# Patient Record
Sex: FEMALE | HISPANIC OR LATINO | Employment: UNEMPLOYED | URBAN - METROPOLITAN AREA
[De-identification: names, ages, dates, MRNs, and addresses within clinical notes are randomized per-mention and may not be internally consistent; named-entity substitution may affect disease eponyms.]

---

## 2017-01-05 ENCOUNTER — ROUTINE PRENATAL (OUTPATIENT)
Dept: OBGYN | Facility: CLINIC | Age: 37
End: 2017-01-05

## 2017-01-05 ENCOUNTER — APPOINTMENT (OUTPATIENT)
Dept: RADIOLOGY | Facility: IMAGING CENTER | Age: 37
End: 2017-01-05
Attending: OBSTETRICS & GYNECOLOGY

## 2017-01-05 VITALS — SYSTOLIC BLOOD PRESSURE: 120 MMHG | WEIGHT: 252 LBS | BODY MASS INDEX: 47.64 KG/M2 | DIASTOLIC BLOOD PRESSURE: 68 MMHG

## 2017-01-05 DIAGNOSIS — O09.292 HISTORY OF GESTATIONAL DIABETES IN PRIOR PREGNANCY, CURRENTLY PREGNANT IN SECOND TRIMESTER: ICD-10-CM

## 2017-01-05 DIAGNOSIS — O09.92 SUPERVISION OF HIGH RISK PREGNANCY IN SECOND TRIMESTER: ICD-10-CM

## 2017-01-05 DIAGNOSIS — Z86.32 HISTORY OF GESTATIONAL DIABETES IN PRIOR PREGNANCY, CURRENTLY PREGNANT IN SECOND TRIMESTER: ICD-10-CM

## 2017-01-05 DIAGNOSIS — O24.410 DIET CONTROLLED GESTATIONAL DIABETES MELLITUS IN THIRD TRIMESTER: ICD-10-CM

## 2017-01-05 LAB
APPEARANCE UR: NORMAL
BILIRUB UR STRIP-MCNC: NORMAL MG/DL
COLOR UR AUTO: NORMAL
GLUCOSE UR STRIP.AUTO-MCNC: NEGATIVE MG/DL
KETONES UR STRIP.AUTO-MCNC: NEGATIVE MG/DL
LEUKOCYTE ESTERASE UR QL STRIP.AUTO: NORMAL
NITRITE UR QL STRIP.AUTO: NEGATIVE
PH UR STRIP.AUTO: 6 [PH] (ref 5–8)
PROT UR QL STRIP: NORMAL MG/DL
RBC UR QL AUTO: NEGATIVE
SP GR UR STRIP.AUTO: 1.01
UROBILINOGEN UR STRIP-MCNC: NORMAL MG/DL

## 2017-01-05 PROCEDURE — 81002 URINALYSIS NONAUTO W/O SCOPE: CPT | Performed by: OBSTETRICS & GYNECOLOGY

## 2017-01-05 PROCEDURE — 90040 PR PRENATAL FOLLOW UP: CPT | Performed by: OBSTETRICS & GYNECOLOGY

## 2017-01-05 PROCEDURE — 76805 OB US >/= 14 WKS SNGL FETUS: CPT | Performed by: OBSTETRICS & GYNECOLOGY

## 2017-01-05 NOTE — PROGRESS NOTES
Lexy Olivares 36 y.o.  24w2d here today for obstetrical visit. Patient reports good  fetal movement. denies contractions, denies vaginal bleeding, denies loss of fluid.    Pregnancy is complicated by   Patient Active Problem List    Diagnosis Date Noted   • Diet controlled gestational diabetes mellitus (GDM) in second trimester 2016   • Elevated glucose tolerance test 11/10/2016   • Supervision of high risk pregnancy in second trimester 10/17/2016   • History of GDMA2 x 3 - early 1hr GTT ordered 10/17/2016     Fasting blood sugars range <90 (one value of 93)  Postprandial blood sugars range <140 (  2 out of range 153, 142)    Fetal survey scheduled today.      Followup in 1 week   labor precautions are reviewed  Continue kick counts daily after 28 weeks  NST twice weekly after 32 weeks if on insulin

## 2017-01-05 NOTE — PROGRESS NOTES
GDM Class A 1 . OB F/U.  + fetal movement  Denies any VB, LO or UC's  Phone # [phone]  Pharmacy confirmed  Diabetic supplies: no

## 2017-01-05 NOTE — MR AVS SNAPSHOT
Lexy Cyril Olivares   2017 1:45 PM   Routine Prenatal   MRN: [id number]    Department:  [facility]   Dept Phone:  [phone]    Description:  Female : 1980   Provider:  Stephanei Pelletier M.D.           Allergies as of 2017     No Known Allergies      You were diagnosed with     Diet controlled gestational diabetes mellitus in third trimester   [4018509]       Supervision of high risk pregnancy in second trimester   [798464]       History of gestational diabetes in prior pregnancy, currently pregnant in second trimester   [7675914]         Vital Signs     Blood Pressure Weight Last Menstrual Period Smoking Status          120/68 mmHg 114.306 kg (252 lb) 2016 (Exact Date) Never Smoker         Basic Information     Date Of Birth Sex Race Ethnicity Preferred Language    1980 Female  or   Origin (Mongolian,Kazakh,Nauruan,Liberian, etc) Mongolian      Your appointments     2017  2:30 PM   US PREG 60 with PREG CTR US 1   [facility] ([facility])    [facility]  [address] [place] [id number]   [phone]           For [facility] patients only: 1. Please arrive 15 min prior to your appointment time. 2. If you're late, you will be rescheduled for the next available appointment. 3. If you need to reschedule your appointment, please call us at [phone] 48 hours prior to your appointment. 4. Do not bring children as they will not be allowed in exam room. 5. Only one family member may be present in room during exam. 6. The exam will be 30-60 minutes depending on the exam ordered by the physician. 7. The sonographer is not allowed to discuss findings during the exam. Your provider will go over the results with you at your next appointment. 8. The purpose of this ultrasound is to determine if baby is healthy. Diagnostic ultrasounds are NOT to determine the gender of the baby.  9. NO photography or video recording is allowed in exam room. 10. NO cell phones allowed in the exam room. INFORMACION SOBRE DE LA FUENTE ULTRASONIDO 1. Por favor de llegar 15 minutos antes de de la fuente celestino. 2. Si llega tarde, le tenemos que cambiar la celestino para otra fecha. 3. Si necesita cambiar de la fuente celestino, por favor llame 48 horas antes de la celestino. [phone] 4. Por favor no traer niños. No se permiten en cuarto de Ultrasonido. 5. Solamente se permite soniya persona en el cuarto yohana el examen. 6. El examen dura 30-60 minutos, dependiendo del examen ordenado por el Doctor. 7. El Sonógrafo no está autorizado hablar sobre de la fuente examen. De La Fuente doctor o partera le va explicar los resultados en de la fuente próxima celestino. 8. El propósito del Ultrasonido es para determinar si de la fuente kathryn viene saludable. No es para determinar el sexo de de la fuente kathryn. 9. Por favor no fotos o cámaras de grabar. 10. No celulares permitidos en el cuarto de examen.              Problem List              ICD-10-CM Priority Class Noted - Resolved    Supervision of high risk pregnancy in second trimester O09.92   10/17/2016 - Present    History of GDMA2 x 3 - early 1hr GTT ordered O09.292, Z86.32   10/17/2016 - Present    Elevated glucose tolerance test R73.02   11/10/2016 - Present    Diet controlled gestational diabetes mellitus (GDM) in second trimester O24.410   11/22/2016 - Present      Health Maintenance        Date Due Completion Dates    IMM INFLUENZA (1) 9/1/2016 10/21/2014    PAP SMEAR 10/17/2019 10/17/2016, 7/7/2014    IMM DTaP/Tdap/Td Vaccine (2 - Td) 12/18/2024 12/18/2014            Results     POCT Urinalysis      Component Value Standard Range & Units    POC Color  Negative    POC Appearance  Negative    POC Leukocyte Esterase small Negative    POC Nitrites negative Negative    POC Urobiligen  Negative (0.2) mg/dL    POC Protein 1+ Negative mg/dL    POC Urine PH 6.0 5.0 - 8.0    POC Blood negative Negative    POC Specific Gravity 1.015 <1.005 - >1.030    POC Ketones  "negative Negative mg/dL    POC Biliruben  Negative mg/dL    POC Glucose negative Negative mg/dL                        Current Immunizations     Influenza Vaccine Quad Inj (Pf) 10/21/2014    Tdap Vaccine 12/18/2014  9:16 AM      Below and/or attached are the medications your provider expects you to take. Review all of your home medications and newly ordered medications with your provider and/or pharmacist. Follow medication instructions as directed by your provider and/or pharmacist. Please keep your medication list with you and share with your provider. Update the information when medications are discontinued, doses are changed, or new medications (including over-the-counter products) are added; and carry medication information at all times in the event of emergency situations     Allergies:  No Known Allergies          Medications  Valid as of: January 05, 2017 -  2:09 PM    Generic Name Brand Name Tablet Size Instructions for use    Blood Glucose Monitoring Suppl (Misc) Blood Glucose Monitoring Suppl SUPPLIES Test strips order: Test strips for FREESTYLE LITE. Please dispense for patient to check blood sugars 4 times daily.        Blood Glucose Monitoring Suppl (Misc) Blood Glucose Monitoring Suppl SUPPLIES Please dispense test strips for Accu Check Polina. Patient to check sugars 4 times daily        Blood Glucose Monitoring Suppl (Misc) Blood Glucose Monitoring Suppl SUPPLIES Please dispense lancets for Accu Check Polina. Patient to check sugars 4 times daily        Ferrous Sulfate (Tab) ferrous sulfate 325 (65 FE) MG Take 325 mg by mouth every day.        Insulin Syringe-Needle U-100 (Misc) Insulin Syringe-Needle U-100 29G X 1/2\" 0.5 ML Patient to inject insulin at bed time        Lancets (Misc) Lancets  Ultra fine Lancets order: Lancets for patient to check blood sugars 4 times daily.        Norethindrone (Tab) MICRONOR 0.35 MG Take 1 Tab by mouth every day.        Prenatal Vitamins (Tab) Prenatal Vitamins (DIS) " Take 1 Each by mouth every day.        .                 Medicines prescribed today were sent to:     [facility] [facility], [place] - [address]    [address] [place] [address]    Phone: [phone] Fax: [phone]    Open 24 Hours?: No      Medication refill instructions:       If your prescription bottle indicates you have medication refills left, it is not necessary to call your provider’s office. Please contact your pharmacy and they will refill your medication.    If your prescription bottle indicates you do not have any refills left, you may request refills at any time through one of the following ways: The online [employer] system (except Urgent Care), by calling your provider’s office, or by asking your pharmacy to contact your provider’s office with a refill request. Medication refills are processed only during regular business hours and may not be available until the next business day. Your provider may request additional information or to have a follow-up visit with you prior to refilling your medication.   *Please Note: Medication refills are assigned a new Rx number when refilled electronically. Your pharmacy may indicate that no refills were authorized even though a new prescription for the same medication is available at the pharmacy. Please request the medicine by name with the pharmacy before contacting your provider for a refill.           [employer] Status: Patient Declined

## 2017-01-06 ENCOUNTER — DATING (OUTPATIENT)
Dept: OBGYN | Facility: CLINIC | Age: 37
End: 2017-01-06

## 2017-01-12 ENCOUNTER — ROUTINE PRENATAL (OUTPATIENT)
Dept: OBGYN | Facility: CLINIC | Age: 37
End: 2017-01-12

## 2017-01-12 VITALS — DIASTOLIC BLOOD PRESSURE: 62 MMHG | WEIGHT: 252.7 LBS | SYSTOLIC BLOOD PRESSURE: 112 MMHG | BODY MASS INDEX: 47.77 KG/M2

## 2017-01-12 DIAGNOSIS — O24.410 DIET CONTROLLED GESTATIONAL DIABETES MELLITUS IN THIRD TRIMESTER: ICD-10-CM

## 2017-01-12 DIAGNOSIS — O09.292 HISTORY OF GESTATIONAL DIABETES IN PRIOR PREGNANCY, CURRENTLY PREGNANT IN SECOND TRIMESTER: ICD-10-CM

## 2017-01-12 DIAGNOSIS — Z86.32 HISTORY OF GESTATIONAL DIABETES IN PRIOR PREGNANCY, CURRENTLY PREGNANT IN SECOND TRIMESTER: ICD-10-CM

## 2017-01-12 DIAGNOSIS — O09.92 SUPERVISION OF HIGH RISK PREGNANCY IN SECOND TRIMESTER: ICD-10-CM

## 2017-01-12 LAB
APPEARANCE UR: NORMAL
BILIRUB UR STRIP-MCNC: NORMAL MG/DL
COLOR UR AUTO: NORMAL
GLUCOSE UR STRIP.AUTO-MCNC: NEGATIVE MG/DL
KETONES UR STRIP.AUTO-MCNC: NEGATIVE MG/DL
LEUKOCYTE ESTERASE UR QL STRIP.AUTO: NEGATIVE
NITRITE UR QL STRIP.AUTO: NEGATIVE
PH UR STRIP.AUTO: 6 [PH] (ref 5–8)
PROT UR QL STRIP: NORMAL MG/DL
RBC UR QL AUTO: NEGATIVE
SP GR UR STRIP.AUTO: 1.02
UROBILINOGEN UR STRIP-MCNC: NORMAL MG/DL

## 2017-01-12 PROCEDURE — 81002 URINALYSIS NONAUTO W/O SCOPE: CPT | Performed by: OBSTETRICS & GYNECOLOGY

## 2017-01-12 PROCEDURE — 90040 PR PRENATAL FOLLOW UP: CPT | Performed by: OBSTETRICS & GYNECOLOGY

## 2017-01-12 NOTE — PROGRESS NOTES
Pt denies CTX, LOF, VB or any other c/o.  Good fetal movement.    Lab:  Recent Results (from the past 672 hour(s))   POCT Urinalysis    Collection Time: 16 11:00 AM   Result Value Ref Range    POC Color  Negative    POC Appearance  Negative    POC Leukocyte Esterase Negative Negative    POC Nitrites Negative Negative    POC Urobiligen  Negative (0.2) mg/dL    POC Protein Negative Negative mg/dL    POC Urine PH 8.0 5.0 - 8.0    POC Blood Negative Negative    POC Specific Gravity 1.015 <1.005 - >1.030    POC Ketones Negative Negative mg/dL    POC Biliruben  Negative mg/dL    POC Glucose Negative Negative mg/dL   POCT Urinalysis    Collection Time: 17  1:50 PM   Result Value Ref Range    POC Color  Negative    POC Appearance  Negative    POC Leukocyte Esterase small Negative    POC Nitrites negative Negative    POC Urobiligen  Negative (0.2) mg/dL    POC Protein 1+ Negative mg/dL    POC Urine PH 6.0 5.0 - 8.0    POC Blood negative Negative    POC Specific Gravity 1.015 <1.005 - >1.030    POC Ketones negative Negative mg/dL    POC Biliruben  Negative mg/dL    POC Glucose negative Negative mg/dL       A/P:  36 y.o.  at 25w2d presents for obstetric follow-up. A1GDM - good control    1.  Continue prenatal vitamins.  2.  Begin/continue kick counts at 28 weeks   3.  Watch weight gain.  4.  Increase water intake.  5.  Follow-up in 2 weeks.  6.  PTL/labor precautions given

## 2017-01-12 NOTE — MR AVS SNAPSHOT
Lexy Olivares   2017 2:30 PM   Routine Prenatal   MRN: [id number]    Department:  [facility]   Dept Phone:  [phone]    Description:  Female : 1980   Provider:  Alisson Márquez M.D.           Allergies as of 2017     No Known Allergies      You were diagnosed with     Supervision of high risk pregnancy in second trimester   [222469]       History of gestational diabetes in prior pregnancy, currently pregnant in second trimester   [3203287]       Diet controlled gestational diabetes mellitus in third trimester   [7578461]         Vital Signs     Blood Pressure Weight Last Menstrual Period Smoking Status          112/62 mmHg 114.624 kg (252 lb 11.2 oz) 2016 (Exact Date) Never Smoker         Basic Information     Date Of Birth Sex Race Ethnicity Preferred Language    1980 Female  or   Origin (Colombian,Japanese,Jordanian,Reynaldo, etc) Colombian      Problem List              ICD-10-CM Priority Class Noted - Resolved    Supervision of high risk pregnancy in second trimester O09.92   10/17/2016 - Present    History of GDMA2 x 3 - early 1hr GTT ordered O09.292, Z86.32   10/17/2016 - Present    Elevated glucose tolerance test R73.02   11/10/2016 - Present    Diet controlled gestational diabetes mellitus (GDM) in second trimester O24.410   2016 - Present      Health Maintenance        Date Due Completion Dates    IMM INFLUENZA (1) 2016 10/21/2014    PAP SMEAR 10/17/2019 10/17/2016, 2014    IMM DTaP/Tdap/Td Vaccine (2 - Td) 2024            Current Immunizations     Influenza Vaccine Quad Inj (Pf) 10/21/2014    Tdap Vaccine 2014  9:16 AM      Below and/or attached are the medications your provider expects you to take. Review all of your home medications and newly ordered medications with your provider and/or pharmacist. Follow medication instructions as directed by your provider and/or pharmacist. Please keep  "your medication list with you and share with your provider. Update the information when medications are discontinued, doses are changed, or new medications (including over-the-counter products) are added; and carry medication information at all times in the event of emergency situations     Allergies:  No Known Allergies          Medications  Valid as of: January 12, 2017 -  3:11 PM    Generic Name Brand Name Tablet Size Instructions for use    Blood Glucose Monitoring Suppl (Misc) Blood Glucose Monitoring Suppl SUPPLIES Test strips order: Test strips for FREESTYLE LITE. Please dispense for patient to check blood sugars 4 times daily.        Blood Glucose Monitoring Suppl (Misc) Blood Glucose Monitoring Suppl SUPPLIES Please dispense test strips for Accu Check Polina. Patient to check sugars 4 times daily        Blood Glucose Monitoring Suppl (Misc) Blood Glucose Monitoring Suppl SUPPLIES Please dispense lancets for Accu Check Polina. Patient to check sugars 4 times daily        Ferrous Sulfate (Tab) ferrous sulfate 325 (65 FE) MG Take 325 mg by mouth every day.        Insulin Syringe-Needle U-100 (Misc) Insulin Syringe-Needle U-100 29G X 1/2\" 0.5 ML Patient to inject insulin at bed time        Lancets (Misc) Lancets  Ultra fine Lancets order: Lancets for patient to check blood sugars 4 times daily.        Norethindrone (Tab) MICRONOR 0.35 MG Take 1 Tab by mouth every day.        Prenatal Vitamins (Tab) Prenatal Vitamins (DIS) Take 1 Each by mouth every day.        .                 Medicines prescribed today were sent to:     [facility] [address] [place] [address] - [address]     [address] [facility] [address]    Phone: [phone] Fax: [phone]    Open 24 Hours?: No      Medication refill instructions:       If your prescription bottle indicates you have medication refills left, it is not necessary to call your provider’s office. Please contact your pharmacy and they will refill your medication.    If your " prescription bottle indicates you do not have any refills left, you may request refills at any time through one of the following ways: The online [employer] system (except Urgent Care), by calling your provider’s office, or by asking your pharmacy to contact your provider’s office with a refill request. Medication refills are processed only during regular business hours and may not be available until the next business day. Your provider may request additional information or to have a follow-up visit with you prior to refilling your medication.   *Please Note: Medication refills are assigned a new Rx number when refilled electronically. Your pharmacy may indicate that no refills were authorized even though a new prescription for the same medication is available at the pharmacy. Please request the medicine by name with the pharmacy before contacting your provider for a refill.        Instructions    PTL precautions          [employer] Status: Patient Declined

## 2017-01-26 ENCOUNTER — ROUTINE PRENATAL (OUTPATIENT)
Dept: OBGYN | Facility: CLINIC | Age: 37
End: 2017-01-26

## 2017-01-26 VITALS — SYSTOLIC BLOOD PRESSURE: 110 MMHG | BODY MASS INDEX: 48.21 KG/M2 | WEIGHT: 255 LBS | DIASTOLIC BLOOD PRESSURE: 64 MMHG

## 2017-01-26 DIAGNOSIS — Z86.32 HISTORY OF GESTATIONAL DIABETES IN PRIOR PREGNANCY, CURRENTLY PREGNANT IN SECOND TRIMESTER: ICD-10-CM

## 2017-01-26 DIAGNOSIS — O09.92 SUPERVISION OF HIGH RISK PREGNANCY IN SECOND TRIMESTER: ICD-10-CM

## 2017-01-26 DIAGNOSIS — O09.292 HISTORY OF GESTATIONAL DIABETES IN PRIOR PREGNANCY, CURRENTLY PREGNANT IN SECOND TRIMESTER: ICD-10-CM

## 2017-01-26 LAB
APPEARANCE UR: NORMAL
BILIRUB UR STRIP-MCNC: NORMAL MG/DL
COLOR UR AUTO: NORMAL
GLUCOSE UR STRIP.AUTO-MCNC: NEGATIVE MG/DL
KETONES UR STRIP.AUTO-MCNC: NEGATIVE MG/DL
LEUKOCYTE ESTERASE UR QL STRIP.AUTO: NEGATIVE
NITRITE UR QL STRIP.AUTO: NEGATIVE
PH UR STRIP.AUTO: 6.5 [PH] (ref 5–8)
PROT UR QL STRIP: NEGATIVE MG/DL
RBC UR QL AUTO: NEGATIVE
SP GR UR STRIP.AUTO: 1.01
UROBILINOGEN UR STRIP-MCNC: NORMAL MG/DL

## 2017-01-26 PROCEDURE — 81002 URINALYSIS NONAUTO W/O SCOPE: CPT | Performed by: OBSTETRICS & GYNECOLOGY

## 2017-01-26 PROCEDURE — 90040 PR PRENATAL FOLLOW UP: CPT | Performed by: OBSTETRICS & GYNECOLOGY

## 2017-01-26 NOTE — MR AVS SNAPSHOT
Lexy Olivares   2017 3:00 PM   Routine Prenatal   MRN: [id number]    Department:  [facility]   Dept Phone:  [phone]    Description:  Female : 1980   Provider:  Sosa Burger M.D.           Allergies as of 2017     No Known Allergies      You were diagnosed with     Supervision of high risk pregnancy in second trimester   [491693]       History of gestational diabetes in prior pregnancy, currently pregnant in second trimester   [8368526]         Vital Signs     Blood Pressure Weight Last Menstrual Period Smoking Status          110/64 mmHg 115.667 kg (255 lb) 2016 (Exact Date) Never Smoker         Basic Information     Date Of Birth Sex Race Ethnicity Preferred Language    1980 Female  or   Origin (Greek,Portuguese,South African,Cambodian, etc) Greek      Your appointments     2017  3:00 PM   Diabetic with VINH EMERSON   [facility] [address] [address] [phone]              Problem List              ICD-10-CM Priority Class Noted - Resolved    Supervision of high risk pregnancy in second trimester O09.92   10/17/2016 - Present    History of GDMA2 x 3 - early 1hr GTT ordered O09.292, Z86.32   10/17/2016 - Present    Elevated glucose tolerance test R73.02   11/10/2016 - Present    Diet controlled gestational diabetes mellitus (GDM) in second trimester O24.410   2016 - Present      Health Maintenance        Date Due Completion Dates    IMM INFLUENZA (1) 2016 10/21/2014    PAP SMEAR 10/17/2019 10/17/2016, 2014    IMM DTaP/Tdap/Td Vaccine (2 - Td) 2024            Results     POCT Urinalysis                   Current Immunizations     Influenza Vaccine Quad Inj (Pf) 10/21/2014    Tdap Vaccine 2014  9:16 AM      Below and/or attached are the medications your provider expects you to take. Review all of your home medications and newly ordered medications  "with your provider and/or pharmacist. Follow medication instructions as directed by your provider and/or pharmacist. Please keep your medication list with you and share with your provider. Update the information when medications are discontinued, doses are changed, or new medications (including over-the-counter products) are added; and carry medication information at all times in the event of emergency situations     Allergies:  No Known Allergies          Medications  Valid as of: January 26, 2017 -  3:13 PM    Generic Name Brand Name Tablet Size Instructions for use    Blood Glucose Monitoring Suppl (Misc) Blood Glucose Monitoring Suppl SUPPLIES Test strips order: Test strips for FREESTYLE LITE. Please dispense for patient to check blood sugars 4 times daily.        Blood Glucose Monitoring Suppl (Misc) Blood Glucose Monitoring Suppl SUPPLIES Please dispense test strips for Accu Check Polina. Patient to check sugars 4 times daily        Blood Glucose Monitoring Suppl (Misc) Blood Glucose Monitoring Suppl SUPPLIES Please dispense lancets for Accu Check Polina. Patient to check sugars 4 times daily        Ferrous Sulfate (Tab) ferrous sulfate 325 (65 FE) MG Take 325 mg by mouth every day.        Insulin Syringe-Needle U-100 (Misc) Insulin Syringe-Needle U-100 29G X 1/2\" 0.5 ML Patient to inject insulin at bed time        Lancets (Misc) Lancets  Ultra fine Lancets order: Lancets for patient to check blood sugars 4 times daily.        Norethindrone (Tab) MICRONOR 0.35 MG Take 1 Tab by mouth every day.        Prenatal Vitamins (Tab) Prenatal Vitamins (DIS) Take 1 Each by mouth every day.        .                 Medicines prescribed today were sent to:     [facility] [address] [place], [place] [address] [address] [facility] [facility] [address]    Phone: [phone] Fax: [phone]    Open 24 Hours?: No      Medication refill instructions:       If your prescription bottle indicates you have medication refills left, it is not " necessary to call your provider’s office. Please contact your pharmacy and they will refill your medication.    If your prescription bottle indicates you do not have any refills left, you may request refills at any time through one of the following ways: The online [employer] system (except Urgent Care), by calling your provider’s office, or by asking your pharmacy to contact your provider’s office with a refill request. Medication refills are processed only during regular business hours and may not be available until the next business day. Your provider may request additional information or to have a follow-up visit with you prior to refilling your medication.   *Please Note: Medication refills are assigned a new Rx number when refilled electronically. Your pharmacy may indicate that no refills were authorized even though a new prescription for the same medication is available at the pharmacy. Please request the medicine by name with the pharmacy before contacting your provider for a refill.           "[employer]hart Status: Patient Declined

## 2017-01-26 NOTE — PROGRESS NOTES
Lexy Nam Elise is a 36 y.o.  at 27w2d here today for obstetrical visit.  Patient is without complaints.    She reports good fetal movement.  She denies vaginal bleeding.  She denies rupture of membranes.  She denies contractions.     has Supervision of high risk pregnancy in second trimester; History of GDMA2 x 3 - early 1hr GTT ordered; Elevated glucose tolerance test; and Diet controlled gestational diabetes mellitus (GDM) in second trimester on her problem list.    Patient with some mildly elevated fasting blood sugars, has had gestational diabetes in the past and also been on insulin in the past. We'll likely need insulin in the next few visits    A/P IUP at 27w2d  AFP done  1 hour glucola done  Rhogam done  GBS     F/U in 1 weeks

## 2017-01-26 NOTE — PROGRESS NOTES
Pt here for OB f/u. Denies any complications today. Reports +FM.  Good phone# [phone]  Pharmacy verified with pt.

## 2017-02-02 ENCOUNTER — ROUTINE PRENATAL (OUTPATIENT)
Dept: OBGYN | Facility: CLINIC | Age: 37
End: 2017-02-02

## 2017-02-02 VITALS — DIASTOLIC BLOOD PRESSURE: 68 MMHG | WEIGHT: 254 LBS | BODY MASS INDEX: 48.02 KG/M2 | SYSTOLIC BLOOD PRESSURE: 112 MMHG

## 2017-02-02 DIAGNOSIS — Z86.32 HISTORY OF GESTATIONAL DIABETES IN PRIOR PREGNANCY, CURRENTLY PREGNANT IN SECOND TRIMESTER: ICD-10-CM

## 2017-02-02 DIAGNOSIS — O09.92 SUPERVISION OF HIGH RISK PREGNANCY IN SECOND TRIMESTER: ICD-10-CM

## 2017-02-02 DIAGNOSIS — O09.292 HISTORY OF GESTATIONAL DIABETES IN PRIOR PREGNANCY, CURRENTLY PREGNANT IN SECOND TRIMESTER: ICD-10-CM

## 2017-02-02 DIAGNOSIS — O24.410 DIET CONTROLLED GESTATIONAL DIABETES MELLITUS IN THIRD TRIMESTER: ICD-10-CM

## 2017-02-02 LAB
APPEARANCE UR: NORMAL
BILIRUB UR STRIP-MCNC: NORMAL MG/DL
COLOR UR AUTO: NORMAL
GLUCOSE UR STRIP.AUTO-MCNC: NEGATIVE MG/DL
KETONES UR STRIP.AUTO-MCNC: NEGATIVE MG/DL
LEUKOCYTE ESTERASE UR QL STRIP.AUTO: NORMAL
NITRITE UR QL STRIP.AUTO: NEGATIVE
PH UR STRIP.AUTO: 6 [PH] (ref 5–8)
PROT UR QL STRIP: NORMAL MG/DL
RBC UR QL AUTO: NEGATIVE
SP GR UR STRIP.AUTO: 1.02
UROBILINOGEN UR STRIP-MCNC: NORMAL MG/DL

## 2017-02-02 PROCEDURE — 81002 URINALYSIS NONAUTO W/O SCOPE: CPT | Performed by: OBSTETRICS & GYNECOLOGY

## 2017-02-02 PROCEDURE — 90715 TDAP VACCINE 7 YRS/> IM: CPT | Performed by: OBSTETRICS & GYNECOLOGY

## 2017-02-02 PROCEDURE — 90040 PR PRENATAL FOLLOW UP: CPT | Performed by: OBSTETRICS & GYNECOLOGY

## 2017-02-02 PROCEDURE — 90471 IMMUNIZATION ADMIN: CPT | Performed by: OBSTETRICS & GYNECOLOGY

## 2017-02-02 NOTE — PROGRESS NOTES
Pt. Here for OB/F/U GDM today Kick Count sheet given and explained to pt.   Good FM  Good # [phone]  Pt given H&H and RPR lab slip today.   Pt states having some nausea.   Tdap vaccine given today, right deltoid. Screening checklist reviewed with pt.   Declines BTL  Pt instructed to bring her log book on next visit.

## 2017-02-02 NOTE — Clinical Note
Cuente los Movimientos de de la fuente Bebé  Otro paso importante para la radha de de la fuente bebé    Lexy Olivares     [facility]            Dept: [phone]    ¿Cuántas semanas tiene de embarazo? 28w2d    Fecha cuando tiene que comenzar a contar el movimiento: 2/2/17                  Tad debe usar norma diagrama    Soniya manera en que de la fuente doctor puede controlar a radha de de la fuente bebé es sabiendo cuantas veces se mueve de la fuente bebé en el útero, o por medio de las “pataditas”.  Usted podrá ayudarle a de la fuente médico al usar cada día el siguiente diagrama.    Cada día, usted debe prestar atención a cuantas horas le lleva a de la fuente bebé moverse 10 veces.  Comience a contar en la mañana, lo antes posible después de haberse levantado.    · Primeramente, escriba la hora en que se mueve de la fuente bebé, hasta llegar a 10 veces.  · Colóquele un check o palomita a cada cuadrito cada vez que de la fuente bebé se mueva hasta que complete 10 veces.  · Escriba la hora cuando termine de contar 10 veces en la última columna.  · Sume el total del tiempo que le llevó contar los 10 movimientos.  · Finalmente, complete el cuadrito de cuantas horas le llevó hacerlo.    Después de mariela contado los 10 movimientos, ya no tendrá que contar los demás movimientos por el morelia del día.  A la mañana siguiente, comience a contar de nuevo cuantas veces se mueve el bebé desde el momento en que se levante.    ¿Qué tendría que considerarse un “movimiento”?  Es difícil de decirlo porque es distinto de soniya madre a otra, y de un embarazo a otro.  Lo importante es que cuente el movimiento de la misma manera yohana el transcurso de de la fuente embarazo.  Si tiene preguntas adicionales, pregúntele a de la fuente doctor.    ¡Cuente cuidadosamente cada día!     MUESTRA:  Semana 28    ¿Cuántas horas le ha llevado sentir 10 movimientos?        Hora de Inicio     1     2     3     4     5     6     7     8     9     10   Hora de Finlizar   [place]. 8:20 ·  ·  ·  ·  ·  ·  ·  ·  ·  ·  11:40   Mar.                Mié.               Jue.               Vie.               Sáb.               Dom.                 IMPORTANTE:  Usted debe contactar a de la fuente doctor si le lleva más de 2 horas sentir 10 movimientos de de la fuente bebé.    Cada mañana, escriba la hora de inicio y comience a contar los movimientos de de la fuente bebé.  Hágalo colocándole un check o palomita a cada cuadrito cada vez que sienta un movimiento de de la fuente bebé.  Cuando haya sentido 10 “pataditas”, escriba la hora en que terminó de contar en la última columna.  Luego, complete en la cajita (arriba de la negra de check o palomita) el número total de horas que le llevó hacerlo.  Asegúrese de leer completamente las instrucciones en la página anterior.

## 2017-02-02 NOTE — MR AVS SNAPSHOT
Lexy Olivares   2017 3:00 PM   Routine Prenatal   MRN: [id number]    [facility]   Dept Phone:  [phone]    Description:  Female : 1980   Provider:  Jose Jacob M.D.           Allergies as of 2017     No Known Allergies      You were diagnosed with     Supervision of high risk pregnancy in second trimester   [846977]       History of gestational diabetes in prior pregnancy, currently pregnant in second trimester   [3940375]       Diet controlled gestational diabetes mellitus in third trimester   [9601213]         Vital Signs     Blood Pressure Weight Last Menstrual Period Smoking Status          112/68 mmHg 115.214 kg (254 lb) 2016 (Exact Date) Never Smoker         Basic Information     Date Of Birth Sex Race Ethnicity Preferred Language    1980 Female  or   Origin (Macedonian,Chilean,Cuban,Turkish, etc) Macedonian      Your appointments     2017  3:30 PM   Diabetic with VINH EMERSON   [facility] [address] [address] [phone]              Problem List              ICD-10-CM Priority Class Noted - Resolved    Supervision of high risk pregnancy in second trimester O09.92   10/17/2016 - Present    History of GDMA2 x 3 - early 1hr GTT ordered O09.292, Z86.32   10/17/2016 - Present    Elevated glucose tolerance test R73.02   11/10/2016 - Present    Diet controlled gestational diabetes mellitus (GDM) in second trimester O24.410   2016 - Present      Health Maintenance        Date Due Completion Dates    IMM INFLUENZA (1) 2016 10/21/2014    PAP SMEAR 10/17/2019 10/17/2016, 2014    IMM DTaP/Tdap/Td Vaccine (2 - Td) 2024            Results     POCT Urinalysis                   Current Immunizations     Influenza Vaccine Quad Inj (Pf) 10/21/2014    Tdap Vaccine 2017, 2014  9:16 AM      Below and/or attached are the medications your  "provider expects you to take. Review all of your home medications and newly ordered medications with your provider and/or pharmacist. Follow medication instructions as directed by your provider and/or pharmacist. Please keep your medication list with you and share with your provider. Update the information when medications are discontinued, doses are changed, or new medications (including over-the-counter products) are added; and carry medication information at all times in the event of emergency situations     Allergies:  No Known Allergies          Medications  Valid as of: February 02, 2017 -  3:41 PM    Generic Name Brand Name Tablet Size Instructions for use    Blood Glucose Monitoring Suppl (Misc) Blood Glucose Monitoring Suppl SUPPLIES Test strips order: Test strips for FREESTYLE LITE. Please dispense for patient to check blood sugars 4 times daily.        Blood Glucose Monitoring Suppl (Misc) Blood Glucose Monitoring Suppl SUPPLIES Please dispense test strips for Accu Check Polina. Patient to check sugars 4 times daily        Blood Glucose Monitoring Suppl (Misc) Blood Glucose Monitoring Suppl SUPPLIES Please dispense lancets for Accu Check Polina. Patient to check sugars 4 times daily        Ferrous Sulfate (Tab) ferrous sulfate 325 (65 FE) MG Take 325 mg by mouth every day.        Insulin Syringe-Needle U-100 (Misc) Insulin Syringe-Needle U-100 29G X 1/2\" 0.5 ML Patient to inject insulin at bed time        Lancets (Misc) Lancets  Ultra fine Lancets order: Lancets for patient to check blood sugars 4 times daily.        Norethindrone (Tab) MICRONOR 0.35 MG Take 1 Tab by mouth every day.        Prenatal Vitamins (Tab) Prenatal Vitamins (DIS) Take 1 Each by mouth every day.        .                 Medicines prescribed today were sent to:     [facility] [address] [place] [address] [place] - [address] [facility] [address]    Phone: [phone] Fax: [phone]    Open 24 Hours?: No      Medication refill " instructions:       If your prescription bottle indicates you have medication refills left, it is not necessary to call your provider’s office. Please contact your pharmacy and they will refill your medication.    If your prescription bottle indicates you do not have any refills left, you may request refills at any time through one of the following ways: The online [employer] system (except Urgent Care), by calling your provider’s office, or by asking your pharmacy to contact your provider’s office with a refill request. Medication refills are processed only during regular business hours and may not be available until the next business day. Your provider may request additional information or to have a follow-up visit with you prior to refilling your medication.   *Please Note: Medication refills are assigned a new Rx number when refilled electronically. Your pharmacy may indicate that no refills were authorized even though a new prescription for the same medication is available at the pharmacy. Please request the medicine by name with the pharmacy before contacting your provider for a refill.        Your To Do List     Future Labs/Procedures Complete By Expires    HCT  As directed 2/3/2018    HGB  As directed 2/3/2018    T.PALLIDUM AB EIA  As directed 2/2/2018         [employer] Status: Patient Declined

## 2017-02-02 NOTE — PROGRESS NOTES
Diabetic prenatal visit;    28w2d  Patient Active Problem List    Diagnosis Date Noted   • Diet controlled gestational diabetes mellitus (GDM) in second trimester 2016   • Elevated glucose tolerance test 11/10/2016   • Supervision of high risk pregnancy in second trimester 10/17/2016   • History of GDMA2 x 3 - early 1hr GTT ordered 10/17/2016       The patient presents for prenatal care. She is doing well. Denies contractions leakage of fluid or vaginal bleeding. Having good fetal movement    Filed Vitals:    17 1515   BP: 112/68   Weight: 115.214 kg (254 lb)       Hemoglobin A1C; 5.4    Fasting sugars; 80s  One hour Postprandial sugars; under 130    Size equals dates, normal fetal heart rate      Continue ADA diet      Class AI GDM-well controlled  Patient has polyhydramnios ultrasound on  shows SLICK of 22-discussed  labor precautions    Followup; 2 weeks      Pertussis vaccination today  CBC RPR today

## 2017-02-09 ENCOUNTER — ROUTINE PRENATAL (OUTPATIENT)
Dept: OBGYN | Facility: CLINIC | Age: 37
End: 2017-02-09

## 2017-02-09 VITALS — DIASTOLIC BLOOD PRESSURE: 70 MMHG | SYSTOLIC BLOOD PRESSURE: 116 MMHG

## 2017-02-09 DIAGNOSIS — O09.292 HISTORY OF GESTATIONAL DIABETES IN PRIOR PREGNANCY, CURRENTLY PREGNANT IN SECOND TRIMESTER: ICD-10-CM

## 2017-02-09 DIAGNOSIS — O09.92 SUPERVISION OF HIGH RISK PREGNANCY IN SECOND TRIMESTER: ICD-10-CM

## 2017-02-09 DIAGNOSIS — O24.419 GDM, CLASS A2: ICD-10-CM

## 2017-02-09 DIAGNOSIS — Z86.32 HISTORY OF GESTATIONAL DIABETES IN PRIOR PREGNANCY, CURRENTLY PREGNANT IN SECOND TRIMESTER: ICD-10-CM

## 2017-02-09 LAB
APPEARANCE UR: NORMAL
BILIRUB UR STRIP-MCNC: NORMAL MG/DL
COLOR UR AUTO: NORMAL
GLUCOSE UR STRIP.AUTO-MCNC: NORMAL MG/DL
KETONES UR STRIP.AUTO-MCNC: NORMAL MG/DL
LEUKOCYTE ESTERASE UR QL STRIP.AUTO: NORMAL
NITRITE UR QL STRIP.AUTO: NORMAL
PH UR STRIP.AUTO: 6 [PH] (ref 5–8)
PROT UR QL STRIP: NORMAL MG/DL
RBC UR QL AUTO: NORMAL
SP GR UR STRIP.AUTO: 1.02
UROBILINOGEN UR STRIP-MCNC: NORMAL MG/DL

## 2017-02-09 PROCEDURE — 81002 URINALYSIS NONAUTO W/O SCOPE: CPT | Performed by: OBSTETRICS & GYNECOLOGY

## 2017-02-09 PROCEDURE — 90040 PR PRENATAL FOLLOW UP: CPT | Performed by: OBSTETRICS & GYNECOLOGY

## 2017-02-09 RX ORDER — IBUPROFEN 200 MG
10 TABLET ORAL DAILY
Qty: 100 EACH | Refills: 10 | Status: ON HOLD | OUTPATIENT
Start: 2017-02-09 | End: 2017-04-21

## 2017-02-09 NOTE — PROGRESS NOTES
Pt here today for OB follow up / GDM  Reports +FM  WT: 255 lb  BP: 116/70  Pt states no complaints today  Pt was instructed at her last appt to bring her glucose meter today. Pt did not bring meter only log book. Pt stated that her meter has not been working since last Saturday and she has been using her neighbors meter to check her blood sugars.   BTL discussed. Pt declines.  Good # [phone]

## 2017-02-09 NOTE — MR AVS SNAPSHOT
Lexy Olivares   2017 3:30 PM   Routine Prenatal   MRN: [id number]    [facility]   Dept Phone:  [phone]    Description:  Female : 1980   Provider:  Alisson Márquez M.D.           Allergies as of 2017     No Known Allergies      You were diagnosed with     Supervision of high risk pregnancy in second trimester   [746263]       History of gestational diabetes in prior pregnancy, currently pregnant in second trimester   [8673591]       GDM, class A2   [339127]         Vital Signs     Blood Pressure Last Menstrual Period Smoking Status             116/70 mmHg 2016 (Exact Date) Never Smoker          Basic Information     Date Of Birth Sex Race Ethnicity Preferred Language    1980 Female  or   Origin (Algerian,Omani,Kyrgyz,Irish, etc) Algerian      Problem List              ICD-10-CM Priority Class Noted - Resolved    Supervision of high risk pregnancy in second trimester O09.92   10/17/2016 - Present    History of GDMA2 x 3 - early 1hr GTT ordered O09.292, Z86.32   10/17/2016 - Present    Elevated glucose tolerance test R73.02   11/10/2016 - Present    Diet controlled gestational diabetes mellitus (GDM) in second trimester O24.410   2016 - Present    GDM, class A2 O24.414   2017 - Present      Health Maintenance        Date Due Completion Dates    IMM INFLUENZA (1) 2016 10/21/2014    PAP SMEAR 10/17/2019 10/17/2016, 2014    IMM DTaP/Tdap/Td Vaccine (3 - Td) 2027, 2014            Results     POCT Urinalysis      Component Value Standard Range & Units    POC Color  Negative    POC Appearance  Negative    POC Leukocyte Esterase Neg Negative    POC Nitrites Neg Negative    POC Urobiligen  Negative (0.2) mg/dL    POC Protein Trace Negative mg/dL    POC Urine PH 6.0 5.0 - 8.0    POC Blood Neg Negative    POC Specific Gravity 1.025 <1.005 - >1.030    POC Ketones Small Negative mg/dL    POC  "Biliruben  Negative mg/dL    POC Glucose Neg Negative mg/dL                        Current Immunizations     Influenza Vaccine Quad Inj (Pf) 10/21/2014    Tdap Vaccine 2/2/2017, 12/18/2014  9:16 AM      Below and/or attached are the medications your provider expects you to take. Review all of your home medications and newly ordered medications with your provider and/or pharmacist. Follow medication instructions as directed by your provider and/or pharmacist. Please keep your medication list with you and share with your provider. Update the information when medications are discontinued, doses are changed, or new medications (including over-the-counter products) are added; and carry medication information at all times in the event of emergency situations     Allergies:  No Known Allergies          Medications  Valid as of: February 09, 2017 -  4:33 PM    Generic Name Brand Name Tablet Size Instructions for use    Blood Glucose Monitoring Suppl (Misc) Blood Glucose Monitoring Suppl SUPPLIES Test strips order: Test strips for FREESTYLE LITE. Please dispense for patient to check blood sugars 4 times daily.        Blood Glucose Monitoring Suppl (Misc) Blood Glucose Monitoring Suppl SUPPLIES Please dispense test strips for Accu Check Polina. Patient to check sugars 4 times daily        Blood Glucose Monitoring Suppl (Misc) Blood Glucose Monitoring Suppl SUPPLIES Please dispense lancets for Accu Check Polina. Patient to check sugars 4 times daily        Blood Glucose Monitoring Suppl (Kit) FREESTYLE  1 Each by Does not apply route Once for 1 dose.        Ferrous Sulfate (Tab) ferrous sulfate 325 (65 FE) MG Take 325 mg by mouth every day.        Glucose Blood (Strip) glucose blood  1 Strip by Other route 4 times a day.        Insulin Syringe-Needle U-100 (Misc) Insulin Syringe-Needle U-100 29G X 1/2\" 0.5 ML Patient to inject insulin at bed time        Insulin Syringe-Needle U-100 (Misc) INSULIN SYRINGE .5CC/29G 29G X 1/2\" 0.5 " ML 10 Units by Does not apply route every day at 6 PM.        Lancets (Misc) Lancets  Ultra fine Lancets order: Lancets for patient to check blood sugars 4 times daily.        Norethindrone (Tab) MICRONOR 0.35 MG Take 1 Tab by mouth every day.        Prenatal Vitamins (Tab) Prenatal Vitamins (DIS) Take 1 Each by mouth every day.        .                 Medicines prescribed today were sent to:     [facility] [address] [place] [address], [place] - [address] [place] [address]    Phone: [phone] Fax: [phone]    Open 24 Hours?: No      Medication refill instructions:       If your prescription bottle indicates you have medication refills left, it is not necessary to call your provider’s office. Please contact your pharmacy and they will refill your medication.    If your prescription bottle indicates you do not have any refills left, you may request refills at any time through one of the following ways: The online [employer] system (except Urgent Care), by calling your provider’s office, or by asking your pharmacy to contact your provider’s office with a refill request. Medication refills are processed only during regular business hours and may not be available until the next business day. Your provider may request additional information or to have a follow-up visit with you prior to refilling your medication.   *Please Note: Medication refills are assigned a new Rx number when refilled electronically. Your pharmacy may indicate that no refills were authorized even though a new prescription for the same medication is available at the pharmacy. Please request the medicine by name with the pharmacy before contacting your provider for a refill.        Instructions    PTL precautions  Start NPH 5 units QHS  RX for Care Chest given          Trema Grouphart Status: Patient Declined

## 2017-02-10 NOTE — PROGRESS NOTES
Pt denies CTX, LOF, VB or any other c/o.  Good fetal movement.    Lab:  Recent Results (from the past 672 hour(s))   POCT Urinalysis    Collection Time: 17  2:50 PM   Result Value Ref Range    POC Color  Negative    POC Appearance  Negative    POC Leukocyte Esterase Negative Negative    POC Nitrites Negative Negative    POC Urobiligen  Negative (0.2) mg/dL    POC Protein Negative Negative mg/dL    POC Urine PH 6.5 5.0 - 8.0    POC Blood Negative Negative    POC Specific Gravity 1.010 <1.005 - >1.030    POC Ketones Negative Negative mg/dL    POC Biliruben  Negative mg/dL    POC Glucose Negative Negative mg/dL   POCT Urinalysis    Collection Time: 17  3:11 PM   Result Value Ref Range    POC Color  Negative    POC Appearance  Negative    POC Leukocyte Esterase Trace Negative    POC Nitrites Negative Negative    POC Urobiligen  Negative (0.2) mg/dL    POC Protein Trace Negative mg/dL    POC Urine PH 6.0 5.0 - 8.0    POC Blood Negative Negative    POC Specific Gravity 1.025 <1.005 - >1.030    POC Ketones Negative Negative mg/dL    POC Biliruben  Negative mg/dL    POC Glucose Negative Negative mg/dL   POCT Urinalysis    Collection Time: 17  3:49 PM   Result Value Ref Range    POC Color  Negative    POC Appearance  Negative    POC Leukocyte Esterase Neg Negative    POC Nitrites Neg Negative    POC Urobiligen  Negative (0.2) mg/dL    POC Protein Trace Negative mg/dL    POC Urine PH 6.0 5.0 - 8.0    POC Blood Neg Negative    POC Specific Gravity 1.025 <1.005 - >1.030    POC Ketones Small Negative mg/dL    POC Biliruben  Negative mg/dL    POC Glucose Neg Negative mg/dL       A/P:  36 y.o.  at 29w2d presents for obstetric follow-up. GDM - fastings are all elevated. Will start on 5 units NPH QHS. Has been using neighbor's machine because her machine stopped working.    1.  Continue prenatal vitamins.  2.  Begin/continue kick counts at 28 weeks   3.  Watch weight gain.  4.  Increase water intake.  5.   Follow-up in 1 weeks.  6.  PTL/labor precautions given

## 2017-02-10 NOTE — NON-PROVIDER
Insulin instruction given to patient on 2/9/17. Patient will start 5 units of NPH, QHS. Patient instructed how to draw up insulin,site selection and rotation, self injection technique, proper storage of insulin and disposal of syringes. Patient demonstrated back self injection technique successfully. Advised to follow really close her meal plan. Will call back in case of questions or problems.  1 vial of NPH insulin given to patient  Lot# [id number]  Exp date 2/2019

## 2017-02-16 ENCOUNTER — ROUTINE PRENATAL (OUTPATIENT)
Dept: OBGYN | Facility: CLINIC | Age: 37
End: 2017-02-16

## 2017-02-16 VITALS — DIASTOLIC BLOOD PRESSURE: 76 MMHG | WEIGHT: 256 LBS | BODY MASS INDEX: 48.4 KG/M2 | SYSTOLIC BLOOD PRESSURE: 122 MMHG

## 2017-02-16 DIAGNOSIS — O09.292 HISTORY OF GESTATIONAL DIABETES IN PRIOR PREGNANCY, CURRENTLY PREGNANT IN SECOND TRIMESTER: ICD-10-CM

## 2017-02-16 DIAGNOSIS — E65 OBESE ABDOMEN: ICD-10-CM

## 2017-02-16 DIAGNOSIS — O09.92 SUPERVISION OF HIGH RISK PREGNANCY IN SECOND TRIMESTER: ICD-10-CM

## 2017-02-16 DIAGNOSIS — Z86.32 HISTORY OF GESTATIONAL DIABETES IN PRIOR PREGNANCY, CURRENTLY PREGNANT IN SECOND TRIMESTER: ICD-10-CM

## 2017-02-16 DIAGNOSIS — O24.419 GDM, CLASS A2: ICD-10-CM

## 2017-02-16 LAB
APPEARANCE UR: NORMAL
BILIRUB UR STRIP-MCNC: NORMAL MG/DL
COLOR UR AUTO: NORMAL
GLUCOSE UR STRIP.AUTO-MCNC: NEGATIVE MG/DL
KETONES UR STRIP.AUTO-MCNC: NEGATIVE MG/DL
LEUKOCYTE ESTERASE UR QL STRIP.AUTO: NORMAL
NITRITE UR QL STRIP.AUTO: NEGATIVE
PH UR STRIP.AUTO: 6 [PH] (ref 5–8)
PROT UR QL STRIP: 30 MG/DL
RBC UR QL AUTO: NEGATIVE
SP GR UR STRIP.AUTO: 1.02
UROBILINOGEN UR STRIP-MCNC: NORMAL MG/DL

## 2017-02-16 PROCEDURE — 90040 PR PRENATAL FOLLOW UP: CPT | Performed by: OBSTETRICS & GYNECOLOGY

## 2017-02-16 PROCEDURE — 81002 URINALYSIS NONAUTO W/O SCOPE: CPT | Performed by: OBSTETRICS & GYNECOLOGY

## 2017-02-16 NOTE — PROGRESS NOTES
Pt here for GDM f/u. Denies any complications today. Reports +FM.pt states she has a cough but no fever.   Good phone# [phone]  Pharmacy verified with pt.

## 2017-02-16 NOTE — MR AVS SNAPSHOT
Lexy Olivares   2017 3:15 PM   Routine Prenatal   MRN: [id number]    [facility]   Dept Phone:  [phone]    Description:  Female : 1980   Provider:  VINH EMERSON           Allergies as of 2017     No Known Allergies      You were diagnosed with     Supervision of high risk pregnancy in second trimester   [200923]       History of gestational diabetes in prior pregnancy, currently pregnant in second trimester   [9329942]       GDM, class A2   [493952]         Vital Signs     Blood Pressure Weight Last Menstrual Period Smoking Status          122/76 mmHg 116.121 kg (256 lb) 2016 (Exact Date) Never Smoker         Basic Information     Date Of Birth Sex Race Ethnicity Preferred Language    1980 Female  or   Origin (Vietnamese,Croatian,Saudi Arabian,Congolese, etc) Vietnamese      Your appointments     2017  4:00 PM   Diabetic with VINH EMERSON   [facility] [address] [id number] [phone]              Problem List              ICD-10-CM Priority Class Noted - Resolved    Supervision of high risk pregnancy in second trimester O09.92   10/17/2016 - Present    History of GDMA2 x 3 - early 1hr GTT ordered O09.292, Z86.32   10/17/2016 - Present    Elevated glucose tolerance test R73.02   11/10/2016 - Present    Diet controlled gestational diabetes mellitus (GDM) in second trimester O24.410   2016 - Present    GDM, class A2 O24.414   2017 - Present      Health Maintenance        Date Due Completion Dates    IMM INFLUENZA (1) 2016 10/21/2014    PAP SMEAR 10/17/2019 10/17/2016, 2014    IMM DTaP/Tdap/Td Vaccine (3 - Td) 2027, 2014            Current Immunizations     Influenza Vaccine Quad Inj (Pf) 10/21/2014    Tdap Vaccine 2017, 2014  9:16 AM      Below and/or attached are the medications your provider expects you to take. Review all of your home  "medications and newly ordered medications with your provider and/or pharmacist. Follow medication instructions as directed by your provider and/or pharmacist. Please keep your medication list with you and share with your provider. Update the information when medications are discontinued, doses are changed, or new medications (including over-the-counter products) are added; and carry medication information at all times in the event of emergency situations     Allergies:  No Known Allergies          Medications  Valid as of: February 16, 2017 -  3:41 PM    Generic Name Brand Name Tablet Size Instructions for use    Blood Glucose Monitoring Suppl (Misc) Blood Glucose Monitoring Suppl SUPPLIES Test strips order: Test strips for FREESTYLE LITE. Please dispense for patient to check blood sugars 4 times daily.        Blood Glucose Monitoring Suppl (Misc) Blood Glucose Monitoring Suppl SUPPLIES Please dispense test strips for Accu Check Polina. Patient to check sugars 4 times daily        Blood Glucose Monitoring Suppl (Misc) Blood Glucose Monitoring Suppl SUPPLIES Please dispense lancets for Accu Check Polina. Patient to check sugars 4 times daily        Ferrous Sulfate (Tab) ferrous sulfate 325 (65 FE) MG Take 325 mg by mouth every day.        Glucose Blood (Strip) glucose blood  1 Strip by Other route 4 times a day.        Insulin Syringe-Needle U-100 (Misc) Insulin Syringe-Needle U-100 29G X 1/2\" 0.5 ML Patient to inject insulin at bed time        Insulin Syringe-Needle U-100 (Misc) INSULIN SYRINGE .5CC/29G 29G X 1/2\" 0.5 ML 10 Units by Does not apply route every day at 6 PM.        Lancets (Misc) Lancets  Ultra fine Lancets order: Lancets for patient to check blood sugars 4 times daily.        Norethindrone (Tab) MICRONOR 0.35 MG Take 1 Tab by mouth every day.        Prenatal Vitamins (Tab) Prenatal Vitamins (DIS) Take 1 Each by mouth every day.        .                 Medicines prescribed today were sent to:     " [facility] [address] [place] [address], [place] - [address]    [address] [place] [address]    Phone: [phone] Fax: [phone]    Open 24 Hours?: No      Medication refill instructions:       If your prescription bottle indicates you have medication refills left, it is not necessary to call your provider’s office. Please contact your pharmacy and they will refill your medication.    If your prescription bottle indicates you do not have any refills left, you may request refills at any time through one of the following ways: The online [employer] system (except Urgent Care), by calling your provider’s office, or by asking your pharmacy to contact your provider’s office with a refill request. Medication refills are processed only during regular business hours and may not be available until the next business day. Your provider may request additional information or to have a follow-up visit with you prior to refilling your medication.   *Please Note: Medication refills are assigned a new Rx number when refilled electronically. Your pharmacy may indicate that no refills were authorized even though a new prescription for the same medication is available at the pharmacy. Please request the medicine by name with the pharmacy before contacting your provider for a refill.           [employer] Status: Patient Declined

## 2017-02-17 NOTE — PROGRESS NOTES
S: Doing well. positive fetal movement.  negative vaginal bleeding.  negative leakage of fluid.  negative contractions.  negative headache. negative nausea/vomiting.  negative RUQ pain.  negative vision changes.  Reviewed blood sugar log with patient.  Reviewed diet.  Questions answered.    O: VSS Afeb    FBS range:elevated       1 hour post prandial range:some elevated    A/P:  36 y.o.  30w2d by 2nd trimester ultrasound with A2GDM who presents for obstetric follow-up.    1.  Labs: HgbA1C= 5.4  2. Change insulin dosage as follows:      AM: 5NPH, QHS: 8NPH  3. NSTs: 2x/week to begin at 32 weeks.  4. Continue prenatal vitamins.  5. Continue fetal kick counts   6.  Watch weight gain.  7.  Increase water intake.  8.  Encouraged prenatal classes.  9.  OBUS to ff-up fetal growth  10.  Follow-up in 1 week for obsetric follow-up.

## 2017-02-17 NOTE — PROGRESS NOTES
Leyx brought her broken meter and it was really broken, so I could not retrieve any numbers from it.  She also brought in a [employer] meter from a neighbor.  The time and date in that meter was not correct.  I could not reconcile any blood sugars with her written record.  I changed the time and date in the new meter, we ordered test strips, and she will bring it in next week.  Dr. Rodriguez increased her insulin based on the blood sugars she brought in today.

## 2017-02-23 ENCOUNTER — ROUTINE PRENATAL (OUTPATIENT)
Dept: OBGYN | Facility: CLINIC | Age: 37
End: 2017-02-23

## 2017-02-23 VITALS — DIASTOLIC BLOOD PRESSURE: 66 MMHG | WEIGHT: 256 LBS | BODY MASS INDEX: 48.4 KG/M2 | SYSTOLIC BLOOD PRESSURE: 118 MMHG

## 2017-02-23 DIAGNOSIS — O09.292 HISTORY OF GESTATIONAL DIABETES IN PRIOR PREGNANCY, CURRENTLY PREGNANT IN SECOND TRIMESTER: ICD-10-CM

## 2017-02-23 DIAGNOSIS — O09.92 SUPERVISION OF HIGH RISK PREGNANCY IN SECOND TRIMESTER: ICD-10-CM

## 2017-02-23 DIAGNOSIS — Z86.32 HISTORY OF GESTATIONAL DIABETES IN PRIOR PREGNANCY, CURRENTLY PREGNANT IN SECOND TRIMESTER: ICD-10-CM

## 2017-02-23 DIAGNOSIS — O24.419 GDM, CLASS A2: ICD-10-CM

## 2017-02-23 LAB
APPEARANCE UR: NORMAL
BILIRUB UR STRIP-MCNC: NORMAL MG/DL
COLOR UR AUTO: NORMAL
GLUCOSE UR STRIP.AUTO-MCNC: NEGATIVE MG/DL
KETONES UR STRIP.AUTO-MCNC: NEGATIVE MG/DL
LEUKOCYTE ESTERASE UR QL STRIP.AUTO: NEGATIVE
NITRITE UR QL STRIP.AUTO: NEGATIVE
PH UR STRIP.AUTO: 7 [PH] (ref 5–8)
PROT UR QL STRIP: NEGATIVE MG/DL
RBC UR QL AUTO: NEGATIVE
SP GR UR STRIP.AUTO: 1.01
UROBILINOGEN UR STRIP-MCNC: NORMAL MG/DL

## 2017-02-23 PROCEDURE — 81002 URINALYSIS NONAUTO W/O SCOPE: CPT | Performed by: OBSTETRICS & GYNECOLOGY

## 2017-02-23 PROCEDURE — 90040 PR PRENATAL FOLLOW UP: CPT | Performed by: OBSTETRICS & GYNECOLOGY

## 2017-02-23 NOTE — MR AVS SNAPSHOT
Lexy Cyril Olivares   2017 4:00 PM   Routine Prenatal   MRN: [id number]    Department:  [facility]   Dept Phone:  [phone]    Description:  Female : 1980   Provider:  Speedy Castelan M.D.           Allergies as of 2017     No Known Allergies      You were diagnosed with     Supervision of high risk pregnancy in second trimester   [301415]       History of gestational diabetes in prior pregnancy, currently pregnant in second trimester   [3094424]       GDM, class A2   [115840]         Vital Signs     Blood Pressure Weight Last Menstrual Period Smoking Status          118/66 mmHg 116.121 kg (256 lb) 2016 (Exact Date) Never Smoker         Basic Information     Date Of Birth Sex Race Ethnicity Preferred Language    1980 Female  or   Origin (Anguillan,Omani,Swedish,Uzbek, etc) Anguillan      Your appointments     2017 11:00 AM   US PREG 30 with PREG CTR US 1   [facility] ([facility])    [facility]  [address] [place] [id number]   [phone]           For [facility] patients only: 1. Please arrive 15 min prior to your appointment time. 2. If you're late, you will be rescheduled for the next available appointment. 3. If you need to reschedule your appointment, please call us at [phone] 48 hours prior to your appointment. 4. Do not bring children as they will not be allowed in exam room. 5. Only one family member may be present in room during exam. 6. The exam will be 30-60 minutes depending on the exam ordered by the physician. 7. The sonographer is not allowed to discuss findings during the exam. Your provider will go over the results with you at your next appointment. 8. The purpose of this ultrasound is to determine if baby is healthy. Diagnostic ultrasounds are NOT to determine the gender of the baby. 9. NO photography or video recording is allowed  in exam room. 10. NO cell phones allowed in the exam room. INFORMACION SOBRE DE LA FUENTE ULTRASONIDO 1. Por favor de llegar 15 minutos antes de de la fuente celestino. 2. Si llega tarde, le tenemos que cambiar la celestino para otra fecha. 3. Si necesita cambiar de la fuente celestino, por favor llame 48 horas antes de la celestino. [phone] 4. Por favor no traer niños. No se permiten en cuarto de Ultrasonido. 5. Solamente se permite soniya persona en el cuarto yohana el examen. 6. El examen dura 30-60 minutos, dependiendo del examen ordenado por el Doctor. 7. El Sonógrafo no está autorizado hablar sobre de la fuenet examen. De La Fuente doctor o partera le va explicar los resultados en de la fuente próxima celestino. 8. El propósito del Ultrasonido es para determinar si de la fuente kathryn viene saludable. No es para determinar el sexo de de la fuente kathryn. 9. Por favor no fotos o cámaras de grabar. 10. No celulares permitidos en el cuarto de examen.              Problem List              ICD-10-CM Priority Class Noted - Resolved    Supervision of high risk pregnancy in second trimester O09.92   10/17/2016 - Present    History of GDMA2 x 3 - early 1hr GTT ordered O09.292, Z86.32   10/17/2016 - Present    GDM, class A2 O24.414   2/9/2017 - Present      Health Maintenance        Date Due Completion Dates    IMM INFLUENZA (1) 9/1/2016 10/21/2014    PAP SMEAR 10/17/2019 10/17/2016, 7/7/2014    IMM DTaP/Tdap/Td Vaccine (3 - Td) 2/2/2027 2/2/2017, 12/18/2014            Results     POCT Urinalysis      Component Value Standard Range & Units    POC Color  Negative    POC Appearance  Negative    POC Leukocyte Esterase NEGATIVE Negative    POC Nitrites NEGATIVE Negative    POC Urobiligen  Negative (0.2) mg/dL    POC Protein NEGATIVE Negative mg/dL    POC Urine PH 7.0 5.0 - 8.0    POC Blood NEGATIVE Negative    POC Specific Gravity 1.010 <1.005 - >1.030    POC Ketones NEGATIVE Negative mg/dL    POC Biliruben  Negative mg/dL    POC Glucose NEGATIVE Negative mg/dL                        Current Immunizations     Influenza Vaccine  "Quad Inj (Pf) 10/21/2014    Tdap Vaccine 2/2/2017, 12/18/2014  9:16 AM      Below and/or attached are the medications your provider expects you to take. Review all of your home medications and newly ordered medications with your provider and/or pharmacist. Follow medication instructions as directed by your provider and/or pharmacist. Please keep your medication list with you and share with your provider. Update the information when medications are discontinued, doses are changed, or new medications (including over-the-counter products) are added; and carry medication information at all times in the event of emergency situations     Allergies:  No Known Allergies          Medications  Valid as of: February 23, 2017 -  4:22 PM    Generic Name Brand Name Tablet Size Instructions for use    Blood Glucose Monitoring Suppl (Misc) Blood Glucose Monitoring Suppl SUPPLIES Test strips order: Test strips for FREESTYLE LITE. Please dispense for patient to check blood sugars 4 times daily.        Blood Glucose Monitoring Suppl (Misc) Blood Glucose Monitoring Suppl SUPPLIES Please dispense test strips for Accu Check Polina. Patient to check sugars 4 times daily        Blood Glucose Monitoring Suppl (Misc) Blood Glucose Monitoring Suppl SUPPLIES Please dispense lancets for Accu Check Polina. Patient to check sugars 4 times daily        Blood Glucose Monitoring Suppl (Misc) Blood Glucose Monitoring Suppl SUPPLIES Please dispense test strips for True Metrix glucometer. Patient to test 4 times daily        Ferrous Sulfate (Tab) ferrous sulfate 325 (65 FE) MG Take 325 mg by mouth every day.        Glucose Blood (Strip) glucose blood  1 Strip by Other route 4 times a day.        Glucose Blood (Strip) glucose blood  Patient to check blood sugar 4 times a day.        Insulin Syringe-Needle U-100 (Misc) Insulin Syringe-Needle U-100 29G X 1/2\" 0.5 ML Patient to inject insulin at bed time        Insulin Syringe-Needle U-100 (Misc) INSULIN " "SYRINGE .5CC/29G 29G X 1/2\" 0.5 ML 10 Units by Does not apply route every day at 6 PM.        Lancets (Misc) Lancets  Ultra fine Lancets order: Lancets for patient to check blood sugars 4 times daily.        Norethindrone (Tab) MICRONOR 0.35 MG Take 1 Tab by mouth every day.        Prenatal Vitamins (Tab) Prenatal Vitamins (DIS) Take 1 Each by mouth every day.        .                 Medicines prescribed today were sent to:     [facility] [address] [facility], [place] - [address] [place] [address]    Phone: [phone] Fax: [phone]    Open 24 Hours?: No      Medication refill instructions:       If your prescription bottle indicates you have medication refills left, it is not necessary to call your provider’s office. Please contact your pharmacy and they will refill your medication.    If your prescription bottle indicates you do not have any refills left, you may request refills at any time through one of the following ways: The online RawFlow system (except Urgent Care), by calling your provider’s office, or by asking your pharmacy to contact your provider’s office with a refill request. Medication refills are processed only during regular business hours and may not be available until the next business day. Your provider may request additional information or to have a follow-up visit with you prior to refilling your medication.   *Please Note: Medication refills are assigned a new Rx number when refilled electronically. Your pharmacy may indicate that no refills were authorized even though a new prescription for the same medication is available at the pharmacy. Please request the medicine by name with the pharmacy before contacting your provider for a refill.           MyChart Status: Patient Declined        "

## 2017-02-24 NOTE — PROGRESS NOTES
36 y.o.   31w2d with diagnosis of GDM: gestational. obesity    Subjective: Fetal movement: positive, Vaginal Bleeding:negative, Loss of Fluid: negative, Following diet :positive, Insulin Use:positive. Blood sugar logs reviewed and are posted in diabetic flowsheet.   Patient Active Problem List    Diagnosis Date Noted   • GDM, class A2 2017   • Supervision of high risk pregnancy in second trimester 10/17/2016   • History of GDMA2 x 3 - early 1hr GTT ordered 10/17/2016       Current Treatment:     AM     insulin injections: NPH: 5    PM:    diet    QHS   insulin injections: NPH: 8    FBS Range: most < 90   Postprandial Range: many  130 after dinner     Filed Vitals:    17 1603   BP: 118/66   Weight: 116.121 kg (256 lb)       NST:    NST reactive    Ass:     31w2d  GDM: Class __A-2____  Good control negative      P. New(yes)  Continue Same (no  )Diabetic treatment Plan  AM: insulin injections: NPH: 5  PM   Insulin regular 4   QHS:insulin injections: NPH: 8     NST 2x /week after 32 weeks  Frequent assessment of  Fetal weight  IOL /C/S delivery at 38-39 weeks

## 2017-02-24 NOTE — PROGRESS NOTES
OB f/u - A2 GDM  + fetal movement.  No VB, LOF or UC's.  Good phone # [phone]  Preferred pharmacy confirmed.  Pt c/o pelvic pressure at times  Rx for test strips given to patient

## 2017-02-24 NOTE — PROGRESS NOTES
Reviewed with pt on how to draw and self inject regular insulin. Pt to start on 4 units of Regular insulin before dinner as ordered. Pt advised to inject 15-30min prior dinner. Pt inform to make sure she eats her dinner after injecting Regular insulin. Pt verbalized understanding.     One vial of Regular Insulin given lot# [id number], Exp; 12/2018

## 2017-02-28 ENCOUNTER — APPOINTMENT (OUTPATIENT)
Dept: RADIOLOGY | Facility: IMAGING CENTER | Age: 37
End: 2017-02-28
Attending: OBSTETRICS & GYNECOLOGY

## 2017-02-28 ENCOUNTER — HOSPITAL ENCOUNTER (OUTPATIENT)
Dept: LAB | Facility: MEDICAL CENTER | Age: 37
End: 2017-02-28
Attending: OBSTETRICS & GYNECOLOGY
Payer: COMMERCIAL

## 2017-02-28 DIAGNOSIS — O09.92 SUPERVISION OF HIGH RISK PREGNANCY IN SECOND TRIMESTER: ICD-10-CM

## 2017-02-28 LAB
HCT VFR BLD AUTO: 39.7 % (ref 37–47)
HGB BLD-MCNC: 13.3 G/DL (ref 12–16)
TREPONEMA PALLIDUM IGG+IGM AB [PRESENCE] IN SERUM OR PLASMA BY IMMUNOASSAY: NON REACTIVE

## 2017-02-28 PROCEDURE — 76815 OB US LIMITED FETUS(S): CPT | Mod: 26 | Performed by: OBSTETRICS & GYNECOLOGY

## 2017-03-02 ENCOUNTER — ROUTINE PRENATAL (OUTPATIENT)
Dept: OBGYN | Facility: CLINIC | Age: 37
End: 2017-03-02

## 2017-03-02 VITALS — DIASTOLIC BLOOD PRESSURE: 66 MMHG | SYSTOLIC BLOOD PRESSURE: 115 MMHG | WEIGHT: 255 LBS | BODY MASS INDEX: 48.21 KG/M2

## 2017-03-02 DIAGNOSIS — O24.419 GDM, CLASS A2: Primary | ICD-10-CM

## 2017-03-02 DIAGNOSIS — Z86.32 HISTORY OF GESTATIONAL DIABETES IN PRIOR PREGNANCY, CURRENTLY PREGNANT IN SECOND TRIMESTER: ICD-10-CM

## 2017-03-02 DIAGNOSIS — O24.419 GDM, CLASS A2: ICD-10-CM

## 2017-03-02 DIAGNOSIS — O09.92 SUPERVISION OF HIGH RISK PREGNANCY IN SECOND TRIMESTER: ICD-10-CM

## 2017-03-02 DIAGNOSIS — O09.292 HISTORY OF GESTATIONAL DIABETES IN PRIOR PREGNANCY, CURRENTLY PREGNANT IN SECOND TRIMESTER: ICD-10-CM

## 2017-03-02 LAB
APPEARANCE UR: NORMAL
BILIRUB UR STRIP-MCNC: NORMAL MG/DL
COLOR UR AUTO: NORMAL
GLUCOSE UR STRIP.AUTO-MCNC: NORMAL MG/DL
KETONES UR STRIP.AUTO-MCNC: NORMAL MG/DL
LEUKOCYTE ESTERASE UR QL STRIP.AUTO: NORMAL
NITRITE UR QL STRIP.AUTO: NORMAL
PH UR STRIP.AUTO: 6.5 [PH] (ref 5–8)
PROT UR QL STRIP: NORMAL MG/DL
RBC UR QL AUTO: NORMAL
SP GR UR STRIP.AUTO: 1.01
UROBILINOGEN UR STRIP-MCNC: NORMAL MG/DL

## 2017-03-02 PROCEDURE — 59025 FETAL NON-STRESS TEST: CPT | Performed by: OBSTETRICS & GYNECOLOGY

## 2017-03-02 PROCEDURE — 81002 URINALYSIS NONAUTO W/O SCOPE: CPT | Performed by: OBSTETRICS & GYNECOLOGY

## 2017-03-02 PROCEDURE — 90040 PR PRENATAL FOLLOW UP: CPT | Performed by: OBSTETRICS & GYNECOLOGY

## 2017-03-02 NOTE — MR AVS SNAPSHOT
Lexy Olivares   3/2/2017 3:15 PM   Routine Prenatal   MRN: [id number]    Department:  [facility]   Dept Phone:  [phone]    Description:  Female : 1980   Provider:  Dorinda Alfredo M.D.           Allergies as of 3/2/2017     No Known Allergies      You were diagnosed with     GDM, class A2   [990638]  -  Primary     Supervision of high risk pregnancy in second trimester   [757674]       History of gestational diabetes in prior pregnancy, currently pregnant in second trimester   [2841730]         Vital Signs     Blood Pressure Weight Last Menstrual Period Smoking Status          115/66 mmHg 115.667 kg (255 lb) 2016 (Exact Date) Never Smoker         Basic Information     Date Of Birth Sex Race Ethnicity Preferred Language    1980 Female  or   Origin (Swedish,Georgian,Omani,Somali, etc) Swedish      Problem List              ICD-10-CM Priority Class Noted - Resolved    Supervision of high risk pregnancy in second trimester O09.92   10/17/2016 - Present    History of GDMA2 x 3 - early 1hr GTT ordered O09.292, Z86.32   10/17/2016 - Present    GDM, class A2 O24.414   2017 - Present      Health Maintenance        Date Due Completion Dates    IMM INFLUENZA (1) 2016 10/21/2014    PAP SMEAR 10/17/2019 10/17/2016, 2014    IMM DTaP/Tdap/Td Vaccine (3 - Td) 2027, 2014            Results     POCT Urinalysis      Component Value Standard Range & Units    POC Color  Negative    POC Appearance  Negative    POC Leukocyte Esterase 2+ Negative    POC Nitrites Neg Negative    POC Urobiligen  Negative (0.2) mg/dL    POC Protein Neg Negative mg/dL    POC Urine PH 6.5 5.0 - 8.0    POC Blood Neg Negative    POC Specific Gravity 1.010 <1.005 - >1.030    POC Ketones Neg Negative mg/dL    POC Biliruben  Negative mg/dL    POC Glucose Neg Negative mg/dL                        Current Immunizations     Influenza Vaccine Quad  "Inj (Pf) 10/21/2014    Tdap Vaccine 2/2/2017, 12/18/2014  9:16 AM      Below and/or attached are the medications your provider expects you to take. Review all of your home medications and newly ordered medications with your provider and/or pharmacist. Follow medication instructions as directed by your provider and/or pharmacist. Please keep your medication list with you and share with your provider. Update the information when medications are discontinued, doses are changed, or new medications (including over-the-counter products) are added; and carry medication information at all times in the event of emergency situations     Allergies:  No Known Allergies          Medications  Valid as of: March 02, 2017 -  4:31 PM    Generic Name Brand Name Tablet Size Instructions for use    Blood Glucose Monitoring Suppl (Misc) Blood Glucose Monitoring Suppl SUPPLIES Test strips order: Test strips for FREESTYLE LITE. Please dispense for patient to check blood sugars 4 times daily.        Blood Glucose Monitoring Suppl (Misc) Blood Glucose Monitoring Suppl SUPPLIES Please dispense test strips for Accu Check Polina. Patient to check sugars 4 times daily        Blood Glucose Monitoring Suppl (Misc) Blood Glucose Monitoring Suppl SUPPLIES Please dispense lancets for Accu Check Polina. Patient to check sugars 4 times daily        Blood Glucose Monitoring Suppl (Misc) Blood Glucose Monitoring Suppl SUPPLIES Please dispense test strips for True Metrix glucometer. Patient to test 4 times daily        Ferrous Sulfate (Tab) ferrous sulfate 325 (65 FE) MG Take 325 mg by mouth every day.        Glucose Blood (Strip) glucose blood  1 Strip by Other route 4 times a day.        Glucose Blood (Strip) glucose blood  Patient to check blood sugar 4 times a day.        Insulin Syringe-Needle U-100 (Misc) Insulin Syringe-Needle U-100 29G X 1/2\" 0.5 ML Patient to inject insulin at bed time        Insulin Syringe-Needle U-100 (Misc) INSULIN SYRINGE " ".5CC/29G 29G X 1/2\" 0.5 ML 10 Units by Does not apply route every day at 6 PM.        Lancets (Misc) Lancets  Ultra fine Lancets order: Lancets for patient to check blood sugars 4 times daily.        Norethindrone (Tab) MICRONOR 0.35 MG Take 1 Tab by mouth every day.        Prenatal Vitamins (Tab) Prenatal Vitamins (DIS) Take 1 Each by mouth every day.        .                 Medicines prescribed today were sent to:     [facility] [address] [facility], [place] - [address] [place] [address]    Phone: [phone] Fax: [phone]    Open 24 Hours?: No      Medication refill instructions:       If your prescription bottle indicates you have medication refills left, it is not necessary to call your provider’s office. Please contact your pharmacy and they will refill your medication.    If your prescription bottle indicates you do not have any refills left, you may request refills at any time through one of the following ways: The online Dibsie system (except Urgent Care), by calling your provider’s office, or by asking your pharmacy to contact your provider’s office with a refill request. Medication refills are processed only during regular business hours and may not be available until the next business day. Your provider may request additional information or to have a follow-up visit with you prior to refilling your medication.   *Please Note: Medication refills are assigned a new Rx number when refilled electronically. Your pharmacy may indicate that no refills were authorized even though a new prescription for the same medication is available at the pharmacy. Please request the medicine by name with the pharmacy before contacting your provider for a refill.           Operative Mindhart Status: Patient Declined        "

## 2017-03-02 NOTE — PROGRESS NOTES
Pt here today for OB follow up / GDM A2  Reports +FM  WT: 255 lb  BP: 115/66  Pt states no complaints today.   Pt declined BTL on 02/09/2017   Good # [phone]

## 2017-03-02 NOTE — MR AVS SNAPSHOT
Lexy Cyril Olivares   3/2/2017 2:30 PM   Routine Prenatal   MRN: [id number]    Department:  [facility]   Dept Phone:  [phone]    Description:  Female : 1980   Provider:  Speedy Castelan M.D.           Allergies as of 3/2/2017     No Known Allergies      You were diagnosed with     GDM, class A2   [719105]         Vital Signs     Last Menstrual Period Smoking Status                2016 (Exact Date) Never Smoker           Basic Information     Date Of Birth Sex Race Ethnicity Preferred Language    1980 Female  or   Origin (Azeri,Costa Rican,Tajik,Reynaldo, etc) Azeri      Problem List              ICD-10-CM Priority Class Noted - Resolved    Supervision of high risk pregnancy in second trimester O09.92   10/17/2016 - Present    History of GDMA2 x 3 - early 1hr GTT ordered O09.292, Z86.32   10/17/2016 - Present    GDM, class A2 O24.414   2017 - Present      Health Maintenance        Date Due Completion Dates    IMM INFLUENZA (1) 2016 10/21/2014    PAP SMEAR 10/17/2019 10/17/2016, 2014    IMM DTaP/Tdap/Td Vaccine (3 - Td) 2027, 2014            Results       Current Immunizations     Influenza Vaccine Quad Inj (Pf) 10/21/2014    Tdap Vaccine 2017, 2014  9:16 AM      Below and/or attached are the medications your provider expects you to take. Review all of your home medications and newly ordered medications with your provider and/or pharmacist. Follow medication instructions as directed by your provider and/or pharmacist. Please keep your medication list with you and share with your provider. Update the information when medications are discontinued, doses are changed, or new medications (including over-the-counter products) are added; and carry medication information at all times in the event of emergency situations     Allergies:  No Known Allergies          Medications  Valid as of: 2017 -  4:30  "PM    Generic Name Brand Name Tablet Size Instructions for use    Blood Glucose Monitoring Suppl (Misc) Blood Glucose Monitoring Suppl SUPPLIES Test strips order: Test strips for FREESTYLE LITE. Please dispense for patient to check blood sugars 4 times daily.        Blood Glucose Monitoring Suppl (Misc) Blood Glucose Monitoring Suppl SUPPLIES Please dispense test strips for Accu Check Polina. Patient to check sugars 4 times daily        Blood Glucose Monitoring Suppl (Misc) Blood Glucose Monitoring Suppl SUPPLIES Please dispense lancets for Accu Check Polina. Patient to check sugars 4 times daily        Blood Glucose Monitoring Suppl (Misc) Blood Glucose Monitoring Suppl SUPPLIES Please dispense test strips for True Metrix glucometer. Patient to test 4 times daily        Ferrous Sulfate (Tab) ferrous sulfate 325 (65 FE) MG Take 325 mg by mouth every day.        Glucose Blood (Strip) glucose blood  1 Strip by Other route 4 times a day.        Glucose Blood (Strip) glucose blood  Patient to check blood sugar 4 times a day.        Insulin Syringe-Needle U-100 (Misc) Insulin Syringe-Needle U-100 29G X 1/2\" 0.5 ML Patient to inject insulin at bed time        Insulin Syringe-Needle U-100 (Misc) INSULIN SYRINGE .5CC/29G 29G X 1/2\" 0.5 ML 10 Units by Does not apply route every day at 6 PM.        Lancets (Misc) Lancets  Ultra fine Lancets order: Lancets for patient to check blood sugars 4 times daily.        Norethindrone (Tab) MICRONOR 0.35 MG Take 1 Tab by mouth every day.        Prenatal Vitamins (Tab) Prenatal Vitamins (DIS) Take 1 Each by mouth every day.        .                 Medicines prescribed today were sent to:     [facility] [address] [place] [address] - [address]     [address] [facility] [address]    Phone: [phone] Fax: [phone]    Open 24 Hours?: No      Medication refill instructions:       If your prescription bottle indicates you have medication refills left, it is not necessary to call your provider’s " office. Please contact your pharmacy and they will refill your medication.    If your prescription bottle indicates you do not have any refills left, you may request refills at any time through one of the following ways: The online [employer] system (except Urgent Care), by calling your provider’s office, or by asking your pharmacy to contact your provider’s office with a refill request. Medication refills are processed only during regular business hours and may not be available until the next business day. Your provider may request additional information or to have a follow-up visit with you prior to refilling your medication.   *Please Note: Medication refills are assigned a new Rx number when refilled electronically. Your pharmacy may indicate that no refills were authorized even though a new prescription for the same medication is available at the pharmacy. Please request the medicine by name with the pharmacy before contacting your provider for a refill.           CENXhart Status: Patient Declined

## 2017-03-03 NOTE — PROGRESS NOTES
S: Doing well. positive fetal movement.  negative vaginal bleeding.  negative leakage of fluid.  negative contractions.  negative headache. negative nausea/vomiting.  negative RUQ pain.  negative vision changes.  Reviewed blood sugar log with patient.  Reviewed diet.  Questions answered.    O: VSS Afeb    FBS range:elevated    A/P:  36 y.o.  32w2d by 17 week ultrasound with A2GDM who presents for obstetric follow-up.    1.  Labs: HgbA1C= 5.4  2. Change insulin dosage as follows:      AM: 5NPH,  Before dinner: 4Reg, QHS: 12NPH  3. NSTs: 2x/week   4. Continue prenatal vitamins.  5. Continue fetal kick counts   6.  Watch weight gain.  7.  Increase water intake.  8.  Encouraged prenatal classes.  9.  Follow-up in 1 week for obsetric follow-up.

## 2017-03-06 ENCOUNTER — ROUTINE PRENATAL (OUTPATIENT)
Dept: OBGYN | Facility: CLINIC | Age: 37
End: 2017-03-06

## 2017-03-06 DIAGNOSIS — O24.414 INSULIN CONTROLLED GESTATIONAL DIABETES MELLITUS (GDM) IN THIRD TRIMESTER: ICD-10-CM

## 2017-03-06 LAB
NST ACOUSTIC STIMULATION: NO
NST ACOUSTIC STIMULATION: NORMAL
NST ACTION NECESSARY: NORMAL
NST ACTION NECESSARY: NORMAL
NST ASSESSMENT: NORMAL
NST ASSESSMENT: REACTIVE
NST BASELINE: 130
NST BASELINE: NORMAL
NST INDICATIONS: NORMAL
NST INDICATIONS: NORMAL
NST OTHER DATA: NORMAL
NST OTHER DATA: NORMAL
NST READ BY: NORMAL
NST READ BY: NORMAL
NST RETURN: NORMAL
NST RETURN: NORMAL
NST UTERINE ACTIVITY: NO
NST UTERINE ACTIVITY: NORMAL

## 2017-03-06 PROCEDURE — 59025 FETAL NON-STRESS TEST: CPT | Performed by: OBSTETRICS & GYNECOLOGY

## 2017-03-06 NOTE — MR AVS SNAPSHOT
Lexy Olivares   3/6/2017 1:30 PM   Routine Prenatal   MRN: [id number]    [facility]   Dept Phone:  [phone]    Description:  Female : 1980   Provider:  Sosa Burger M.D.           Allergies as of 3/6/2017     No Known Allergies      You were diagnosed with     Insulin controlled gestational diabetes mellitus (GDM) in third trimester   [9580795]         Vital Signs     Last Menstrual Period Smoking Status                2016 (Exact Date) Never Smoker           Basic Information     Date Of Birth Sex Race Ethnicity Preferred Language    1980 Female  or   Origin (Palauan,Ghanaian,Estonian,Honduran, etc) Palauan      Your appointments     Mar 09, 2017 10:00 AM   Fetal Non-Stress Test with PC NUHA   [facility])    [address]  [place] [address] [phone]            Mar 09, 2017 11:00 AM   Diabetic with PC MD   [facility])    [address]  [place] [address] [phone]              Problem List              ICD-10-CM Priority Class Noted - Resolved    Supervision of high risk pregnancy in second trimester O09.92   10/17/2016 - Present    History of GDMA2 x 3 - early 1hr GTT ordered O09.292, Z86.32   10/17/2016 - Present    GDM, class A2 O24.414   2017 - Present      Health Maintenance        Date Due Completion Dates    IMM INFLUENZA (1) 2016 10/21/2014    PAP SMEAR 10/17/2019 10/17/2016, 2014    IMM DTaP/Tdap/Td Vaccine (3 - Td) 2027, 2014            Results       Current Immunizations     Influenza Vaccine Quad Inj (Pf) 10/21/2014    Tdap Vaccine 2017, 2014  9:16 AM      Below and/or attached are the medications your provider expects you to take. Review all of your home medications and newly ordered medications with your provider and/or pharmacist. Follow medication instructions as directed by your provider and/or pharmacist.  "Please keep your medication list with you and share with your provider. Update the information when medications are discontinued, doses are changed, or new medications (including over-the-counter products) are added; and carry medication information at all times in the event of emergency situations     Allergies:  No Known Allergies          Medications  Valid as of: March 06, 2017 -  1:59 PM    Generic Name Brand Name Tablet Size Instructions for use    Blood Glucose Monitoring Suppl (Misc) Blood Glucose Monitoring Suppl SUPPLIES Test strips order: Test strips for FREESTYLE LITE. Please dispense for patient to check blood sugars 4 times daily.        Blood Glucose Monitoring Suppl (Misc) Blood Glucose Monitoring Suppl SUPPLIES Please dispense test strips for Accu Check Polina. Patient to check sugars 4 times daily        Blood Glucose Monitoring Suppl (Misc) Blood Glucose Monitoring Suppl SUPPLIES Please dispense lancets for Accu Check Polina. Patient to check sugars 4 times daily        Blood Glucose Monitoring Suppl (Misc) Blood Glucose Monitoring Suppl SUPPLIES Please dispense test strips for True Metrix glucometer. Patient to test 4 times daily        Ferrous Sulfate (Tab) ferrous sulfate 325 (65 FE) MG Take 325 mg by mouth every day.        Glucose Blood (Strip) glucose blood  1 Strip by Other route 4 times a day.        Glucose Blood (Strip) glucose blood  Patient to check blood sugar 4 times a day.        Insulin Syringe-Needle U-100 (Misc) Insulin Syringe-Needle U-100 29G X 1/2\" 0.5 ML Patient to inject insulin at bed time        Insulin Syringe-Needle U-100 (Misc) INSULIN SYRINGE .5CC/29G 29G X 1/2\" 0.5 ML 10 Units by Does not apply route every day at 6 PM.        Lancets (Misc) Lancets  Ultra fine Lancets order: Lancets for patient to check blood sugars 4 times daily.        Norethindrone (Tab) MICRONOR 0.35 MG Take 1 Tab by mouth every day.        Prenatal Vitamins (Tab) Prenatal Vitamins (DIS) Take 1 Each " by mouth every day.        .                 Medicines prescribed today were sent to:     [facility] - [place], [place] - [address]    [address] [place] [address]    Phone: [phone] Fax: [phone]    Open 24 Hours?: No      Medication refill instructions:       If your prescription bottle indicates you have medication refills left, it is not necessary to call your provider’s office. Please contact your pharmacy and they will refill your medication.    If your prescription bottle indicates you do not have any refills left, you may request refills at any time through one of the following ways: The online [employer] system (except Urgent Care), by calling your provider’s office, or by asking your pharmacy to contact your provider’s office with a refill request. Medication refills are processed only during regular business hours and may not be available until the next business day. Your provider may request additional information or to have a follow-up visit with you prior to refilling your medication.   *Please Note: Medication refills are assigned a new Rx number when refilled electronically. Your pharmacy may indicate that no refills were authorized even though a new prescription for the same medication is available at the pharmacy. Please request the medicine by name with the pharmacy before contacting your provider for a refill.           [employer] Status: Patient Declined

## 2017-03-09 ENCOUNTER — ROUTINE PRENATAL (OUTPATIENT)
Dept: OBGYN | Facility: CLINIC | Age: 37
End: 2017-03-09

## 2017-03-09 VITALS — WEIGHT: 252 LBS | BODY MASS INDEX: 47.64 KG/M2 | DIASTOLIC BLOOD PRESSURE: 71 MMHG | SYSTOLIC BLOOD PRESSURE: 105 MMHG

## 2017-03-09 DIAGNOSIS — Z86.32 HISTORY OF GESTATIONAL DIABETES IN PRIOR PREGNANCY, CURRENTLY PREGNANT IN SECOND TRIMESTER: ICD-10-CM

## 2017-03-09 DIAGNOSIS — O24.419 GDM, CLASS A2: ICD-10-CM

## 2017-03-09 DIAGNOSIS — O09.292 HISTORY OF GESTATIONAL DIABETES IN PRIOR PREGNANCY, CURRENTLY PREGNANT IN SECOND TRIMESTER: ICD-10-CM

## 2017-03-09 DIAGNOSIS — O09.92 SUPERVISION OF HIGH RISK PREGNANCY IN SECOND TRIMESTER: ICD-10-CM

## 2017-03-09 LAB
APPEARANCE UR: NORMAL
BILIRUB UR STRIP-MCNC: NORMAL MG/DL
COLOR UR AUTO: NORMAL
GLUCOSE UR STRIP.AUTO-MCNC: NORMAL MG/DL
KETONES UR STRIP.AUTO-MCNC: NORMAL MG/DL
LEUKOCYTE ESTERASE UR QL STRIP.AUTO: NORMAL
NITRITE UR QL STRIP.AUTO: NORMAL
NST ACOUSTIC STIMULATION: NORMAL
NST ACTION NECESSARY: NORMAL
NST ASSESSMENT: NORMAL
NST BASELINE: NORMAL
NST INDICATIONS: NORMAL
NST OTHER DATA: NORMAL
NST READ BY: NORMAL
NST RETURN: NORMAL
NST UTERINE ACTIVITY: NORMAL
PH UR STRIP.AUTO: 6.5 [PH] (ref 5–8)
PROT UR QL STRIP: NORMAL MG/DL
RBC UR QL AUTO: NORMAL
SP GR UR STRIP.AUTO: 1.01
UROBILINOGEN UR STRIP-MCNC: NORMAL MG/DL

## 2017-03-09 PROCEDURE — 59025 FETAL NON-STRESS TEST: CPT | Performed by: OBSTETRICS & GYNECOLOGY

## 2017-03-09 PROCEDURE — 81002 URINALYSIS NONAUTO W/O SCOPE: CPT | Performed by: OBSTETRICS & GYNECOLOGY

## 2017-03-09 PROCEDURE — 90040 PR PRENATAL FOLLOW UP: CPT | Performed by: OBSTETRICS & GYNECOLOGY

## 2017-03-09 NOTE — PROGRESS NOTES
SUBJECTIVE:  Lexy is being seen today for her obstetrical visit.  She is 33 weeks gestation.  Her obstetrical history is significant for diabetes mellitus, gestational. Other risk factors include obesity.    OBJECTIVE:  On examination today, fundal height is 36, which is 36weeks.   Fetal heart tones are at 142/minute.   NST:reactive.  Fasting blood sugars are running between .  one-hour postprandial sugars are running between 104-124.   @LASTLAB[HEMOGLOBLIN A1:1]@5.4    ASSESSMENT/PLAN:  1. Intrauterine pregnancy of 33 weeks gestation, with normal growth.  2. Diabetes Mellitus with abnormal sugar control.  3.  NST: twice weekly    4.   Insulin cahnged to AM_ 5NPH  PM_ 4R bedtime- 15N

## 2017-03-09 NOTE — MR AVS SNAPSHOT
Lexy Olivares   3/9/2017 10:00 AM   Routine Prenatal   MRN: [id number]    [facility]   Dept Phone:  [phone]    Description:  Female : 1980   Provider:  Cary Floyd M.D.           Allergies as of 3/9/2017     No Known Allergies      You were diagnosed with     GDM, class A2   [631161]         Vital Signs     Last Menstrual Period Smoking Status                2016 (Exact Date) Never Smoker           Basic Information     Date Of Birth Sex Race Ethnicity Preferred Language    1980 Female  or   Origin (Portuguese,Brazilian,[place]in[place], etc) Portuguese      Your appointments     Mar 13, 2017  1:30 PM   Fetal Non-Stress Test with PC NST   [facility] [address] [id number] [phone]              Problem List              ICD-10-CM Priority Class Noted - Resolved    Supervision of high risk pregnancy in second trimester O09.92   10/17/2016 - Present    History of GDMA2 x 3 - early 1hr GTT ordered O09.292, Z86.32   10/17/2016 - Present    GDM, class A2 O24.414   2017 - Present      Health Maintenance        Date Due Completion Dates    IMM INFLUENZA (1) 2016 10/21/2014    PAP SMEAR 10/17/2019 10/17/2016, 2014    IMM DTaP/Tdap/Td Vaccine (3 - Td) 2027, 2014            Results     POCT Fetal Nonstress Test      Component    NST Indications    GDM    NST Baseline    NST Uterine Activity    NST Acoustic Stimulation    NST Assessment    NST Action Necessary    category1    NST Other Data    NST Return    NST Read By    abran                        Current Immunizations     Influenza Vaccine Quad Inj (Pf) 10/21/2014    Tdap Vaccine 2017, 2014  9:16 AM      Below and/or attached are the medications your provider expects you to take. Review all of your home medications and newly ordered medications with your provider and/or pharmacist. Follow  "medication instructions as directed by your provider and/or pharmacist. Please keep your medication list with you and share with your provider. Update the information when medications are discontinued, doses are changed, or new medications (including over-the-counter products) are added; and carry medication information at all times in the event of emergency situations     Allergies:  No Known Allergies          Medications  Valid as of: March 09, 2017 - 11:12 AM    Generic Name Brand Name Tablet Size Instructions for use    Blood Glucose Monitoring Suppl (Misc) Blood Glucose Monitoring Suppl SUPPLIES Test strips order: Test strips for FREESTYLE LITE. Please dispense for patient to check blood sugars 4 times daily.        Blood Glucose Monitoring Suppl (Misc) Blood Glucose Monitoring Suppl SUPPLIES Please dispense test strips for Accu Check Polina. Patient to check sugars 4 times daily        Blood Glucose Monitoring Suppl (Misc) Blood Glucose Monitoring Suppl SUPPLIES Please dispense lancets for Accu Check Polina. Patient to check sugars 4 times daily        Blood Glucose Monitoring Suppl (Misc) Blood Glucose Monitoring Suppl SUPPLIES Please dispense test strips for True Metrix glucometer. Patient to test 4 times daily        Ferrous Sulfate (Tab) ferrous sulfate 325 (65 FE) MG Take 325 mg by mouth every day.        Glucose Blood (Strip) glucose blood  1 Strip by Other route 4 times a day.        Glucose Blood (Strip) glucose blood  Patient to check blood sugar 4 times a day.        Insulin Syringe-Needle U-100 (Misc) Insulin Syringe-Needle U-100 29G X 1/2\" 0.5 ML Patient to inject insulin at bed time        Insulin Syringe-Needle U-100 (Misc) INSULIN SYRINGE .5CC/29G 29G X 1/2\" 0.5 ML 10 Units by Does not apply route every day at 6 PM.        Lancets (Misc) Lancets  Ultra fine Lancets order: Lancets for patient to check blood sugars 4 times daily.        Norethindrone (Tab) MICRONOR 0.35 MG Take 1 Tab by mouth every " day.        Prenatal Vitamins (Tab) Prenatal Vitamins (DIS) Take 1 Each by mouth every day.        .                 Medicines prescribed today were sent to:     [facility] - [place], [place] - [address] [place] [address]    Phone: [phone] Fax: [phone]    Open 24 Hours?: No      Medication refill instructions:       If your prescription bottle indicates you have medication refills left, it is not necessary to call your provider’s office. Please contact your pharmacy and they will refill your medication.    If your prescription bottle indicates you do not have any refills left, you may request refills at any time through one of the following ways: The online [employer] system (except Urgent Care), by calling your provider’s office, or by asking your pharmacy to contact your provider’s office with a refill request. Medication refills are processed only during regular business hours and may not be available until the next business day. Your provider may request additional information or to have a follow-up visit with you prior to refilling your medication.   *Please Note: Medication refills are assigned a new Rx number when refilled electronically. Your pharmacy may indicate that no refills were authorized even though a new prescription for the same medication is available at the pharmacy. Please request the medicine by name with the pharmacy before contacting your provider for a refill.           [employer] [facility] [employer] Status: Patient Declined

## 2017-03-09 NOTE — MR AVS SNAPSHOT
Lexy Olivares   3/9/2017 11:00 AM   Routine Prenatal   MRN: [id number]    [facility]   Dept Phone:  [phone]    Description:  Female : 1980   Provider:  Cary Floyd M.D.           Allergies as of 3/9/2017     No Known Allergies      You were diagnosed with     GDM, class A2   [[id number]]       Supervision of high risk pregnancy in second trimester   [710488]       History of gestational diabetes in prior pregnancy, currently pregnant in second trimester   [2106297]         Vital Signs     Blood Pressure Weight Last Menstrual Period Smoking Status          105/71 mmHg 114.306 kg (252 lb) 2016 (Exact Date) Never Smoker         Basic Information     Date Of Birth Sex Race Ethnicity Preferred Language    1980 Female  or   Origin (Mosotho,Puerto Rican,Bulgarian,Austrian, etc) Mosotho      Your appointments     Mar 13, 2017  1:30 PM   Fetal Non-Stress Test with PC NST   [facility] [address] [place] [id number] [phone]              Problem List              ICD-10-CM Priority Class Noted - Resolved    Supervision of high risk pregnancy in second trimester O09.92   10/17/2016 - Present    History of GDMA2 x 3 - early 1hr GTT ordered O09.292, Z86.32   10/17/2016 - Present    GDM, class A2 O24.414   2017 - Present      Health Maintenance        Date Due Completion Dates    IMM INFLUENZA (1) 2016 10/21/2014    PAP SMEAR 10/17/2019 10/17/2016, 2014    IMM DTaP/Tdap/Td Vaccine (3 - Td) 2027, 2014            Results     POCT Urinalysis      Component Value Standard Range & Units    POC Color  Negative    POC Appearance  Negative    POC Leukocyte Esterase Neg Negative    POC Nitrites Neg Negative    POC Urobiligen  Negative (0.2) mg/dL    POC Protein Trace Negative mg/dL    POC Urine PH 6.5 5.0 - 8.0    POC Blood Neg Negative    POC Specific Gravity 1.015 <1.005 - >1.030    POC Ketones Moderate Negative mg/dL    POC Biliruben  Negative mg/dL    POC Glucose Neg Negative mg/dL                        Current Immunizations     Influenza Vaccine Quad Inj (Pf) 10/21/2014    Tdap Vaccine 2/2/2017, 12/18/2014  9:16 AM      Below and/or attached are the medications your provider expects you to take. Review all of your home medications and newly ordered medications with your provider and/or pharmacist. Follow medication instructions as directed by your provider and/or pharmacist. Please keep your medication list with you and share with your provider. Update the information when medications are discontinued, doses are changed, or new medications (including over-the-counter products) are added; and carry medication information at all times in the event of emergency situations     Allergies:  No Known Allergies          Medications  Valid as of: March 09, 2017 - 11:12 AM    Generic Name Brand Name Tablet Size Instructions for use    Blood Glucose Monitoring Suppl (Misc) Blood Glucose Monitoring Suppl SUPPLIES Test strips order: Test strips for FREESTYLE LITE. Please dispense for patient to check blood sugars 4 times daily.        Blood Glucose Monitoring Suppl (Misc) Blood Glucose Monitoring Suppl SUPPLIES Please dispense test strips for Accu Check Polina. Patient to check sugars 4 times daily        Blood Glucose Monitoring Suppl (Misc) Blood Glucose Monitoring Suppl SUPPLIES Please dispense lancets for Accu Check Polina. Patient to check sugars 4 times daily        Blood Glucose Monitoring Suppl (Misc) Blood Glucose Monitoring Suppl SUPPLIES Please dispense test strips for True Metrix glucometer. Patient to test 4 times daily        Ferrous Sulfate (Tab) ferrous sulfate 325 (65 FE) MG Take 325 mg by mouth every day.        Glucose Blood (Strip) glucose blood  1 Strip by Other route 4 times a day.        Glucose Blood (Strip) glucose blood  Patient to check blood sugar 4 times a day.        Insulin  "Syringe-Needle U-100 (Misc) Insulin Syringe-Needle U-100 29G X 1/2\" 0.5 ML Patient to inject insulin at bed time        Insulin Syringe-Needle U-100 (Misc) INSULIN SYRINGE .5CC/29G 29G X 1/2\" 0.5 ML 10 Units by Does not apply route every day at 6 PM.        Lancets (Misc) Lancets  Ultra fine Lancets order: Lancets for patient to check blood sugars 4 times daily.        Norethindrone (Tab) MICRONOR 0.35 MG Take 1 Tab by mouth every day.        Prenatal Vitamins (Tab) Prenatal Vitamins (DIS) Take 1 Each by mouth every day.        .                 Medicines prescribed today [facility] sent to:     [facility] [place], [place] - [address] [place] [address]    Phone: [phone] Fax: [phone]    Open 24 Hours?: No      Medication refill instructions:       If your prescription bottle indicates you have medication refills left, it is not necessary to call your provider’s office. Please contact your pharmacy and they will refill your medication.    If your prescription bottle indicates you do not have any refills left, you may request refills at any time through one of the following ways: The online [employer] system (except Urgent Care), by calling your provider’s office, or by asking your pharmacy to contact your provider’s office with a refill request. Medication refills are processed only during regular business hours and may not be available until the next business day. Your provider may request additional information or to have a follow-up visit with you prior to refilling your medication.   *Please Note: Medication refills are assigned a new Rx number when refilled electronically. Your pharmacy may indicate that no refills were authorized even though a new prescription for the same medication is available at the pharmacy. Please request the medicine by name with the pharmacy before contacting your provider for a refill.           MyChart Status: Patient Declined        "

## 2017-03-09 NOTE — PROGRESS NOTES
OB f/u- A2 GDM  + fetal movement.  No VB, LOF or UC's.  Good phone # [phone]  Preferred pharmacy confirmed.  Pt has complaints at this time

## 2017-03-13 ENCOUNTER — ROUTINE PRENATAL (OUTPATIENT)
Dept: OBGYN | Facility: CLINIC | Age: 37
End: 2017-03-13

## 2017-03-13 DIAGNOSIS — O24.419 GDM, CLASS A2: ICD-10-CM

## 2017-03-13 LAB
NST ACOUSTIC STIMULATION: NORMAL
NST ACTION NECESSARY: NORMAL
NST ASSESSMENT: NORMAL
NST BASELINE: NORMAL
NST INDICATIONS: NORMAL
NST OTHER DATA: NORMAL
NST READ BY: NORMAL
NST RETURN: NORMAL
NST UTERINE ACTIVITY: NORMAL

## 2017-03-13 PROCEDURE — 59025 FETAL NON-STRESS TEST: CPT | Performed by: OBSTETRICS & GYNECOLOGY

## 2017-03-13 NOTE — MR AVS SNAPSHOT
Lexy Olivares   3/13/2017 1:30 PM   Routine Prenatal   MRN: [id number]    [facility]   Dept Phone:  [phone]    Description:  Female : 1980   Provider:  Cary Floyd M.D.           Allergies as of 3/13/2017     No Known Allergies      You were diagnosed with     GDM, class A2   [[id number]]         Vital Signs     Last Menstrual Period Smoking Status                2016 (Exact Date) Never Smoker           Basic Information     Date Of Birth Sex Race Ethnicity Preferred Language    1980 Female  or   Origin (Gambian,Sierra Leonean,Citizen of the [place]al[place], etc) Gambian      Your appointments     Mar 16, 2017  2:00 PM   Fetal Non-Stress Test with PC NST   [facility]    [address] [place] [address] [address]  [place] [address] [phone]            Mar 16, 2017  2:45 PM   Diabetic with PC MD   [facility]    [address] [place] [address] [address]  [place] [address] [phone]              Problem List              ICD-10-CM Priority Class Noted - Resolved    Supervision of high risk pregnancy in second trimester O09.92   10/17/2016 - Present    History of GDMA2 x 3 - early 1hr GTT ordered O09.292, Z86.32   10/17/2016 - Present    GDM, class A2 O24.414   2017 - Present      Health Maintenance        Date Due Completion Dates    IMM INFLUENZA (1) 2016 10/21/2014    PAP SMEAR 10/17/2019 10/17/2016, 2014    IMM DTaP/Tdap/Td Vaccine (3 - Td) 2027, 2014            Results       Current Immunizations     Influenza Vaccine Quad Inj (Pf) 10/21/2014    Tdap Vaccine 2017, 2014  9:16 AM      Below and/or attached are the medications your provider expects you to take. Review all of your home medications and newly ordered medications with your provider and/or pharmacist. Follow medication instructions as directed by your provider and/or pharmacist. Please keep your medication list with you and share with  "your provider. Update the information when medications are discontinued, doses are changed, or new medications (including over-the-counter products) are added; and carry medication information at all times in the event of emergency situations     Allergies:  No Known Allergies          Medications  Valid as of: March 13, 2017 -  2:30 PM    Generic Name Brand Name Tablet Size Instructions for use    Blood Glucose Monitoring Suppl (Misc) Blood Glucose Monitoring Suppl SUPPLIES Test strips order: Test strips for FREESTYLE LITE. Please dispense for patient to check blood sugars 4 times daily.        Blood Glucose Monitoring Suppl (Misc) Blood Glucose Monitoring Suppl SUPPLIES Please dispense test strips for Accu Check Polina. Patient to check sugars 4 times daily        Blood Glucose Monitoring Suppl (Misc) Blood Glucose Monitoring Suppl SUPPLIES Please dispense lancets for Accu Check Polina. Patient to check sugars 4 times daily        Blood Glucose Monitoring Suppl (Misc) Blood Glucose Monitoring Suppl SUPPLIES Please dispense test strips for True Metrix glucometer. Patient to test 4 times daily        Ferrous Sulfate (Tab) ferrous sulfate 325 (65 FE) MG Take 325 mg by mouth every day.        Glucose Blood (Strip) glucose blood  1 Strip by Other route 4 times a day.        Glucose Blood (Strip) glucose blood  Patient to check blood sugar 4 times a day.        Insulin Syringe-Needle U-100 (Misc) Insulin Syringe-Needle U-100 29G X 1/2\" 0.5 ML Patient to inject insulin at bed time        Insulin Syringe-Needle U-100 (Misc) INSULIN SYRINGE .5CC/29G 29G X 1/2\" 0.5 ML 10 Units by Does not apply route every day at 6 PM.        Lancets (Misc) Lancets  Ultra fine Lancets order: Lancets for patient to check blood sugars 4 times daily.        Norethindrone (Tab) MICRONOR 0.35 MG Take 1 Tab by mouth every day.        Prenatal Vitamins (Tab) Prenatal Vitamins (DIS) Take 1 Each by mouth every day.        .                 Medicines " prescribed today were sent to:     [facility]  [place], [place] - [address]    [address] [place] [address]    Phone: [phone] Fax: [phone]    Open 24 Hours?: No      Medication refill instructions:       If your prescription bottle indicates you have medication refills left, it is not necessary to call your provider’s office. Please contact your pharmacy and they will refill your medication.    If your prescription bottle indicates you do not have any refills left, you may request refills at any time through one of the following ways: The online [employer] system (except Urgent Care), by calling your provider’s office, or by asking your pharmacy to contact your provider’s office with a refill request. Medication refills are processed only during regular business hours and may not be available until the next business day. Your provider may request additional information or to have a follow-up visit with you prior to refilling your medication.   *Please Note: Medication refills are assigned a new Rx number when refilled electronically. Your pharmacy may indicate that no refills were authorized even though a new prescription for the same medication is available at the pharmacy. Please request the medicine by name with the pharmacy before contacting your provider for a refill.           [employer] Status: Patient Declined

## 2017-03-16 ENCOUNTER — ROUTINE PRENATAL (OUTPATIENT)
Dept: OBGYN | Facility: CLINIC | Age: 37
End: 2017-03-16

## 2017-03-16 VITALS — SYSTOLIC BLOOD PRESSURE: 118 MMHG | BODY MASS INDEX: 47.45 KG/M2 | WEIGHT: 251 LBS | DIASTOLIC BLOOD PRESSURE: 69 MMHG

## 2017-03-16 DIAGNOSIS — O24.419 GDM, CLASS A2: ICD-10-CM

## 2017-03-16 DIAGNOSIS — Z86.32 HISTORY OF GESTATIONAL DIABETES IN PRIOR PREGNANCY, CURRENTLY PREGNANT IN SECOND TRIMESTER: ICD-10-CM

## 2017-03-16 DIAGNOSIS — O09.292 HISTORY OF GESTATIONAL DIABETES IN PRIOR PREGNANCY, CURRENTLY PREGNANT IN SECOND TRIMESTER: ICD-10-CM

## 2017-03-16 DIAGNOSIS — O09.92 SUPERVISION OF HIGH RISK PREGNANCY IN SECOND TRIMESTER: ICD-10-CM

## 2017-03-16 LAB
APPEARANCE UR: NORMAL
BILIRUB UR STRIP-MCNC: NORMAL MG/DL
COLOR UR AUTO: NORMAL
GLUCOSE UR STRIP.AUTO-MCNC: NEGATIVE MG/DL
KETONES UR STRIP.AUTO-MCNC: NEGATIVE MG/DL
LEUKOCYTE ESTERASE UR QL STRIP.AUTO: NORMAL
NITRITE UR QL STRIP.AUTO: NEGATIVE
NST ACOUSTIC STIMULATION: NORMAL
NST ACTION NECESSARY: NORMAL
NST ASSESSMENT: NORMAL
NST BASELINE: NORMAL
NST INDICATIONS: NORMAL
NST OTHER DATA: NORMAL
NST READ BY: NORMAL
NST RETURN: NORMAL
NST UTERINE ACTIVITY: NORMAL
PH UR STRIP.AUTO: 6.5 [PH] (ref 5–8)
PROT UR QL STRIP: 30 MG/DL
RBC UR QL AUTO: NEGATIVE
SP GR UR STRIP.AUTO: 1.01
UROBILINOGEN UR STRIP-MCNC: NORMAL MG/DL

## 2017-03-16 PROCEDURE — 81002 URINALYSIS NONAUTO W/O SCOPE: CPT | Performed by: OBSTETRICS & GYNECOLOGY

## 2017-03-16 PROCEDURE — 90040 PR PRENATAL FOLLOW UP: CPT | Performed by: OBSTETRICS & GYNECOLOGY

## 2017-03-16 PROCEDURE — 59025 FETAL NON-STRESS TEST: CPT | Performed by: OBSTETRICS & GYNECOLOGY

## 2017-03-16 NOTE — MR AVS SNAPSHOT
Lexy Olivares   3/16/2017 2:45 PM   Routine Prenatal   MRN: [id number]    [facility]   Dept Phone:  [phone]    Description:  Female : 1980   Provider:  Stephanie Pelletier M.D.           Allergies as of 3/16/2017     No Known Allergies      You were diagnosed with     Supervision of high risk pregnancy in second trimester   [890414]       History of gestational diabetes in prior pregnancy, currently pregnant in second trimester   [9988801]       GDM, class A2   [339126]         Vital Signs     Blood Pressure Weight Last Menstrual Period Smoking Status          118/69 mmHg 113.853 kg (251 lb) 2016 (Exact Date) Never Smoker         Basic Information     Date Of Birth Sex Race Ethnicity Preferred Language    1980 Female  or   Origin (Turkish,Guinean,Cymraes,Anguillan, etc) Turkish      Your appointments     Mar 20, 2017  9:30 AM   Fetal Non-Stress Test with PC NST   [facility] [address]  [facility] [address] [phone]              Problem List              ICD-10-CM Priority Class Noted - Resolved    Supervision of high risk pregnancy in second trimester O09.92   10/17/2016 - Present    History of GDMA2 x 3 - early 1hr GTT ordered O09.292, Z86.32   10/17/2016 - Present    GDM, class A2 O24.414   2017 - Present      Health Maintenance        Date Due Completion Dates    IMM INFLUENZA (1) 2016 10/21/2014    PAP SMEAR 10/17/2019 10/17/2016, 2014    IMM DTaP/Tdap/Td Vaccine (3 - Td) 2027, 2014            Results     POCT Urinalysis      Component Value Standard Range & Units    POC Color  Negative    POC Appearance  Negative    POC Leukocyte Esterase Moderate Negative    POC Nitrites Negative Negative    POC Urobiligen  Negative (0.2) mg/dL    POC Protein 30 Negative mg/dL    POC Urine PH 6.5 5.0 - 8.0    POC Blood Negative Negative    POC Specific Gravity 1.015 <1.005 -  >1.030    POC Ketones Negative Negative mg/dL    POC Biliruben  Negative mg/dL    POC Glucose Negative Negative mg/dL                        Current Immunizations     Influenza Vaccine Quad Inj (Pf) 10/21/2014    Tdap Vaccine 2/2/2017, 12/18/2014  9:16 AM      Below and/or attached are the medications your provider expects you to take. Review all of your home medications and newly ordered medications with your provider and/or pharmacist. Follow medication instructions as directed by your provider and/or pharmacist. Please keep your medication list with you and share with your provider. Update the information when medications are discontinued, doses are changed, or new medications (including over-the-counter products) are added; and carry medication information at all times in the event of emergency situations     Allergies:  No Known Allergies          Medications  Valid as of: March 16, 2017 -  3:17 PM    Generic Name Brand Name Tablet Size Instructions for use    Blood Glucose Monitoring Suppl (Misc) Blood Glucose Monitoring Suppl SUPPLIES Test strips order: Test strips for FREESTYLE LITE. Please dispense for patient to check blood sugars 4 times daily.        Blood Glucose Monitoring Suppl (Misc) Blood Glucose Monitoring Suppl SUPPLIES Please dispense test strips for Accu Check Polina. Patient to check sugars 4 times daily        Blood Glucose Monitoring Suppl (Misc) Blood Glucose Monitoring Suppl SUPPLIES Please dispense lancets for Accu Check Oplina. Patient to check sugars 4 times daily        Blood Glucose Monitoring Suppl (Misc) Blood Glucose Monitoring Suppl SUPPLIES Please dispense test strips for True Metrix glucometer. Patient to test 4 times daily        Ferrous Sulfate (Tab) ferrous sulfate 325 (65 FE) MG Take 325 mg by mouth every day.        Glucose Blood (Strip) glucose blood  1 Strip by Other route 4 times a day.        Glucose Blood (Strip) glucose blood  Patient to check blood sugar 4 times a day.   "       Insulin Syringe-Needle U-100 (Misc) Insulin Syringe-Needle U-100 29G X 1/2\" 0.5 ML Patient to inject insulin at bed time        Insulin Syringe-Needle U-100 (Misc) INSULIN SYRINGE .5CC/29G 29G X 1/2\" 0.5 ML 10 Units by Does not apply route every day at 6 PM.        Lancets (Misc) Lancets  Ultra fine Lancets order: Lancets for patient to check blood sugars 4 times daily.        Norethindrone (Tab) MICRONOR 0.35 MG Take 1 Tab by mouth every day.        Prenatal Vitamins (Tab) Prenatal Vitamins (DIS) Take 1 Each by mouth every day.        .                 Medicines prescribed today were sent to:     [facility] [place], [place] - [address] [facility] [address]    Phone: [phone] Fax: [phone]    Open 24 Hours?: No      Medication refill instructions:       If your prescription bottle indicates you have medication refills left, it is not necessary to call your provider’s office. Please contact your pharmacy and they will refill your medication.    If your prescription bottle indicates you do not have any refills left, you may request refills at any time through one of the following ways: The online [employer] system (except Urgent Care), by calling your provider’s office, or by asking your pharmacy to contact your provider’s office with a refill request. Medication refills are processed only during regular business hours and may not be available until the next business day. Your provider may request additional information or to have a follow-up visit with you prior to refilling your medication.   *Please Note: Medication refills are assigned a new Rx number when refilled electronically. Your pharmacy may indicate that no refills were authorized even though a new prescription for the same medication is available at the pharmacy. Please request the medicine by name with the pharmacy before contacting your provider for a refill.           MyChart Status: Patient Declined        "

## 2017-03-16 NOTE — PROGRESS NOTES
Lexy Olivares 36 y.o.  34w2d here today for obstetrical visit. Patient reports good  fetal movement. denies contractions, denies vaginal bleeding, denies loss of fluid.    Pregnancy is complicated by   Patient Active Problem List    Diagnosis Date Noted   • GDM, class A2 2017   • Supervision of high risk pregnancy in second trimester 10/17/2016   • History of GDMA2 x 3 - early 1hr GTT ordered 10/17/2016     Fasting blood sugars range 86-97  Postprandial blood sugars range     Insulin regimen  NPH a.m. 5 ,   Regular with dinner 4  NPH at bedtime 15    Will change to   NPH a.m. 7  Regular with dinner 4  NPH at bedtime 18    Followup in 1 week   labor precautions are reviewed  Continue kick counts daily after 28 weeks  NST twice weekly after 32 weeks if on insulin

## 2017-03-16 NOTE — MR AVS SNAPSHOT
Lexy Olivares   3/16/2017 2:00 PM   Routine Prenatal   MRN: [id number]    [facility]   Dept Phone:  [phone]    Description:  Female : 1980   Provider:  Stephanie Pelletier M.D.           Allergies as of 3/16/2017     No Known Allergies      You were diagnosed with     GDM, class A2   [837082]         Vital Signs     Last Menstrual Period Smoking Status                2016 (Exact Date) Never Smoker           Basic Information     Date Of Birth Sex Race Ethnicity Preferred Language    1980 Female  or   Origin (Romanian,Trinidadian,Jordanian,Reynaldo, etc) Romanian      Your appointments     Mar 20, 2017  9:30 AM   Fetal Non-Stress Test with PC NST   [facility] [address]  [facility] [address] [phone]              Problem List              ICD-10-CM Priority Class Noted - Resolved    Supervision of high risk pregnancy in second trimester O09.92   10/17/2016 - Present    History of GDMA2 x 3 - early 1hr GTT ordered O09.292, Z86.32   10/17/2016 - Present    GDM, class A2 O24.414   2017 - Present      Health Maintenance        Date Due Completion Dates    IMM INFLUENZA (1) 2016 10/21/2014    PAP SMEAR 10/17/2019 10/17/2016, 2014    IMM DTaP/Tdap/Td Vaccine (3 - Td) 2027, 2014            Results     POCT Fetal Nonstress Test      Component    NST Indications    A2GDM    NST Baseline    130s    NST Uterine Activity    None    NST Acoustic Stimulation    NST Assessment    Cat 1    NST Action Necessary    NST Other Data    NST Return    twice weekly    NST Read By    Prabhu                        Current Immunizations     Influenza Vaccine Quad Inj (Pf) 10/21/2014    Tdap Vaccine 2017, 2014  9:16 AM      Below and/or attached are the medications your provider expects you to take. Review all of your home medications and newly ordered medications with your provider and/or  "pharmacist. Follow medication instructions as directed by your provider and/or pharmacist. Please keep your medication list with you and share with your provider. Update the information when medications are discontinued, doses are changed, or new medications (including over-the-counter products) are added; and carry medication information at all times in the event of emergency situations     Allergies:  No Known Allergies          Medications  Valid as of: March 16, 2017 -  3:17 PM    Generic Name Brand Name Tablet Size Instructions for use    Blood Glucose Monitoring Suppl (Misc) Blood Glucose Monitoring Suppl SUPPLIES Test strips order: Test strips for FREESTYLE LITE. Please dispense for patient to check blood sugars 4 times daily.        Blood Glucose Monitoring Suppl (Misc) Blood Glucose Monitoring Suppl SUPPLIES Please dispense test strips for Accu Check Polina. Patient to check sugars 4 times daily        Blood Glucose Monitoring Suppl (Misc) Blood Glucose Monitoring Suppl SUPPLIES Please dispense lancets for Accu Check Polina. Patient to check sugars 4 times daily        Blood Glucose Monitoring Suppl (Misc) Blood Glucose Monitoring Suppl SUPPLIES Please dispense test strips for True Metrix glucometer. Patient to test 4 times daily        Ferrous Sulfate (Tab) ferrous sulfate 325 (65 FE) MG Take 325 mg by mouth every day.        Glucose Blood (Strip) glucose blood  1 Strip by Other route 4 times a day.        Glucose Blood (Strip) glucose blood  Patient to check blood sugar 4 times a day.        Insulin Syringe-Needle U-100 (Misc) Insulin Syringe-Needle U-100 29G X 1/2\" 0.5 ML Patient to inject insulin at bed time        Insulin Syringe-Needle U-100 (Misc) INSULIN SYRINGE .5CC/29G 29G X 1/2\" 0.5 ML 10 Units by Does not apply route every day at 6 PM.        Lancets (Misc) Lancets  Ultra fine Lancets order: Lancets for patient to check blood sugars 4 times daily.        Norethindrone (Tab) MICRONOR 0.35 MG Take 1 " Tab by mouth every day.        Prenatal Vitamins (Tab) Prenatal Vitamins (DIS) Take 1 Each by mouth every day.        .                 Medicines prescribed today were sent to:     [facility] - [place], [place] - [address] [place] [address]    Phone: [phone] Fax: [phone]    Open 24 Hours?: No      Medication refill instructions:       If your prescription bottle indicates you have medication refills left, it is not necessary to call your provider’s office. Please contact your pharmacy and they will refill your medication.    If your prescription bottle indicates you do not have any refills left, you may request refills at any time through one of the following ways: The online [employer] system (except Urgent Care), by calling your provider’s office, or by asking your pharmacy to contact your provider’s office with a refill request. Medication refills are processed only during regular business hours and may not be available until the next business day. Your provider may request additional information or to have a follow-up visit with you prior to refilling your medication.   *Please Note: Medication refills are assigned a new Rx number when refilled electronically. Your pharmacy may indicate that no refills were authorized even though a new prescription for the same medication is available at the pharmacy. Please request the medicine by name with the pharmacy before contacting your provider for a refill.           S[place]art Status: Patient Declined

## 2017-03-23 ENCOUNTER — ROUTINE PRENATAL (OUTPATIENT)
Dept: OBGYN | Facility: CLINIC | Age: 37
End: 2017-03-23

## 2017-03-23 ENCOUNTER — HOSPITAL ENCOUNTER (OUTPATIENT)
Facility: MEDICAL CENTER | Age: 37
End: 2017-03-23
Attending: OBSTETRICS & GYNECOLOGY
Payer: COMMERCIAL

## 2017-03-23 VITALS — DIASTOLIC BLOOD PRESSURE: 66 MMHG | SYSTOLIC BLOOD PRESSURE: 112 MMHG | BODY MASS INDEX: 47.83 KG/M2 | WEIGHT: 253 LBS

## 2017-03-23 DIAGNOSIS — O24.419 GDM, CLASS A2: ICD-10-CM

## 2017-03-23 DIAGNOSIS — O09.292 HISTORY OF GESTATIONAL DIABETES IN PRIOR PREGNANCY, CURRENTLY PREGNANT IN SECOND TRIMESTER: ICD-10-CM

## 2017-03-23 DIAGNOSIS — Z86.32 HISTORY OF GESTATIONAL DIABETES IN PRIOR PREGNANCY, CURRENTLY PREGNANT IN SECOND TRIMESTER: ICD-10-CM

## 2017-03-23 DIAGNOSIS — O09.92 SUPERVISION OF HIGH RISK PREGNANCY IN SECOND TRIMESTER: ICD-10-CM

## 2017-03-23 LAB
APPEARANCE UR: NORMAL
BILIRUB UR STRIP-MCNC: NORMAL MG/DL
COLOR UR AUTO: NORMAL
GLUCOSE UR STRIP.AUTO-MCNC: NEGATIVE MG/DL
KETONES UR STRIP.AUTO-MCNC: NEGATIVE MG/DL
LEUKOCYTE ESTERASE UR QL STRIP.AUTO: NORMAL
NITRITE UR QL STRIP.AUTO: NEGATIVE
NST ACOUSTIC STIMULATION: NORMAL
NST ACTION NECESSARY: NORMAL
NST ASSESSMENT: NORMAL
NST BASELINE: NORMAL
NST INDICATIONS: NORMAL
NST OTHER DATA: NORMAL
NST READ BY: NORMAL
NST RETURN: NORMAL
NST UTERINE ACTIVITY: NORMAL
PH UR STRIP.AUTO: 6 [PH] (ref 5–8)
PROT UR QL STRIP: 30 MG/DL
RBC UR QL AUTO: NEGATIVE
SP GR UR STRIP.AUTO: 1.01
UROBILINOGEN UR STRIP-MCNC: NORMAL MG/DL

## 2017-03-23 PROCEDURE — 59025 FETAL NON-STRESS TEST: CPT | Performed by: OBSTETRICS & GYNECOLOGY

## 2017-03-23 PROCEDURE — 81002 URINALYSIS NONAUTO W/O SCOPE: CPT | Performed by: OBSTETRICS & GYNECOLOGY

## 2017-03-23 PROCEDURE — 90040 PR PRENATAL FOLLOW UP: CPT | Performed by: OBSTETRICS & GYNECOLOGY

## 2017-03-23 NOTE — PROGRESS NOTES
GDM Follow Up  +FM  Pt denies complaints  GBS collected today.  Good phone number-[phone]

## 2017-03-23 NOTE — MR AVS SNAPSHOT
Lexy Olivares   3/23/2017 2:45 PM   Routine Prenatal   MRN: [id number]    [facility]   Dept Phone:  [phone]    Description:  Female : 1980   Provider:  Cary Floyd M.D.           Allergies as of 3/23/2017     No Known Allergies      You were diagnosed with     Supervision of high risk pregnancy in second trimester   [171715]       History of gestational diabetes in prior pregnancy, currently pregnant in second trimester   [1973798]       GDM, class A2   [892322]         Vital Signs     Blood Pressure Weight Last Menstrual Period Smoking Status          112/66 mmHg 114.76 kg (253 lb) 2016 (Exact Date) Never Smoker         Basic Information     Date Of Birth Sex Race Ethnicity Preferred Language    1980 Female  or   Origin (Libyan,Canadian,American,Reynaldo, etc) Libyan      Problem List              ICD-10-CM Priority Class Noted - Resolved    Supervision of high risk pregnancy in second trimester O09.92   10/17/2016 - Present    History of GDMA2 x 3 - early 1hr GTT ordered O09.292, Z86.32   10/17/2016 - Present    GDM, class A2 O24.414   2017 - Present      Health Maintenance        Date Due Completion Dates    IMM INFLUENZA (1) 2016 10/21/2014    PAP SMEAR 10/17/2019 10/17/2016, 2014    IMM DTaP/Tdap/Td Vaccine (3 - Td) 2027, 2014            Results     POCT Urinalysis      Component Value Standard Range & Units    POC Color  Negative    POC Appearance  Negative    POC Leukocyte Esterase Trace Negative    POC Nitrites Negative Negative    POC Urobiligen  Negative (0.2) mg/dL    POC Protein 30 Negative mg/dL    POC Urine PH 6.0 5.0 - 8.0    POC Blood Negative Negative    POC Specific Gravity 1.015 <1.005 - >1.030    POC Ketones Negative Negative mg/dL    POC Biliruben  Negative mg/dL    POC Glucose Negative Negative mg/dL                        Current Immunizations     Influenza Vaccine  "Quad Inj (Pf) 10/21/2014    Tdap Vaccine 2/2/2017, 12/18/2014  9:16 AM      Below and/or attached are the medications your provider expects you to take. Review all of your home medications and newly ordered medications with your provider and/or pharmacist. Follow medication instructions as directed by your provider and/or pharmacist. Please keep your medication list with you and share with your provider. Update the information when medications are discontinued, doses are changed, or new medications (including over-the-counter products) are added; and carry medication information at all times in the event of emergency situations     Allergies:  No Known Allergies          Medications  Valid as of: March 23, 2017 -  2:50 PM    Generic Name Brand Name Tablet Size Instructions for use    Blood Glucose Monitoring Suppl (Misc) Blood Glucose Monitoring Suppl SUPPLIES Test strips order: Test strips for FREESTYLE LITE. Please dispense for patient to check blood sugars 4 times daily.        Blood Glucose Monitoring Suppl (Misc) Blood Glucose Monitoring Suppl SUPPLIES Please dispense test strips for Accu Check Polina. Patient to check sugars 4 times daily        Blood Glucose Monitoring Suppl (Misc) Blood Glucose Monitoring Suppl SUPPLIES Please dispense lancets for Accu Check Ploina. Patient to check sugars 4 times daily        Blood Glucose Monitoring Suppl (Misc) Blood Glucose Monitoring Suppl SUPPLIES Please dispense test strips for True Metrix glucometer. Patient to test 4 times daily        Ferrous Sulfate (Tab) ferrous sulfate 325 (65 FE) MG Take 325 mg by mouth every day.        Glucose Blood (Strip) glucose blood  1 Strip by Other route 4 times a day.        Glucose Blood (Strip) glucose blood  Patient to check blood sugar 4 times a day.        Insulin Syringe-Needle U-100 (Misc) Insulin Syringe-Needle U-100 29G X 1/2\" 0.5 ML Patient to inject insulin at bed time        Insulin Syringe-Needle U-100 (Misc) INSULIN " "SYRINGE .5CC/29G 29G X 1/2\" 0.5 ML 10 Units by Does not apply route every day at 6 PM.        Lancets (Misc) Lancets  Ultra fine Lancets order: Lancets for patient to check blood sugars 4 times daily.        Norethindrone (Tab) MICRONOR 0.35 MG Take 1 Tab by mouth every day.        Prenatal Vitamins (Tab) Prenatal Vitamins (DIS) Take 1 Each by mouth every day.        .                 Medicines prescribed today were sent to:     [facility], [place] - [address] [place] [address]    Phone: [phone] Fax: [phone]    Open 24 Hours?: No      Medication refill instructions:       If your prescription bottle indicates you have medication refills left, it is not necessary to call your provider’s office. Please contact your pharmacy and they will refill your medication.    If your prescription bottle indicates you do not have any refills left, you may request refills at any time through one of the following ways: The online [employer] system (except Urgent Care), by calling your provider’s office, or by asking your pharmacy to contact your provider’s office with a refill request. Medication refills are processed only during regular business hours and may not be available until the next business day. Your provider may request additional information or to have a follow-up visit with you prior to refilling your medication.   *Please Note: Medication refills are assigned a new Rx number when refilled electronically. Your pharmacy may indicate that no refills were authorized even though a new prescription for the same medication is available at the pharmacy. Please request the medicine by name with the pharmacy before contacting your provider for a refill.           MyChart Status: Patient Declined        "

## 2017-03-23 NOTE — MR AVS SNAPSHOT
Lexy Olivares   3/23/2017 2:00 PM   Routine Prenatal   MRN: [id number]    [facility]   Dept Phone:  [phone]    Description:  Female : 1980   Provider:  Cary Floyd M.D.           Allergies as of 3/23/2017     No Known Allergies      You were diagnosed with     GDM, class A2   [208812]         Vital Signs     Last Menstrual Period Smoking Status                2016 (Exact Date) Never Smoker           Basic Information     Date Of Birth Sex Race Ethnicity Preferred Language    1980 Female  or   Origin (Ukrainian,Icelandic,Albanian,Reynaldo, etc) Ukrainian      Problem List              ICD-10-CM Priority Class Noted - Resolved    Supervision of high risk pregnancy in second trimester O09.92   10/17/2016 - Present    History of GDMA2 x 3 - early 1hr GTT ordered O09.292, Z86.32   10/17/2016 - Present    GDM, class A2 O24.414   2017 - Present      Health Maintenance        Date Due Completion Dates    IMM INFLUENZA (1) 2016 10/21/2014    PAP SMEAR 10/17/2019 10/17/2016, 2014    IMM DTaP/Tdap/Td Vaccine (3 - Td) 2027, 2014            Results     POCT Fetal Nonstress Test      Component    NST Indications    GDM    NST Baseline    NST Uterine Activity    NST Acoustic Stimulation    NST Assessment    category1    NST Action Necessary    NST Other Data    NST Return    NST Read By    Everett                        Current Immunizations     Influenza Vaccine Quad Inj (Pf) 10/21/2014    Tdap Vaccine 2017, 2014  9:16 AM      Below and/or attached are the medications your provider expects you to take. Review all of your home medications and newly ordered medications with your provider and/or pharmacist. Follow medication instructions as directed by your provider and/or pharmacist. Please keep your medication list with you and share with your provider. Update the information when medications are  "discontinued, doses are changed, or new medications (including over-the-counter products) are added; and carry medication information at all times in the event of emergency situations     Allergies:  No Known Allergies          Medications  Valid as of: March 23, 2017 -  2:50 PM    Generic Name Brand Name Tablet Size Instructions for use    Blood Glucose Monitoring Suppl (Misc) Blood Glucose Monitoring Suppl SUPPLIES Test strips order: Test strips for FREESTYLE LITE. Please dispense for patient to check blood sugars 4 times daily.        Blood Glucose Monitoring Suppl (Misc) Blood Glucose Monitoring Suppl SUPPLIES Please dispense test strips for Accu Check Polina. Patient to check sugars 4 times daily        Blood Glucose Monitoring Suppl (Misc) Blood Glucose Monitoring Suppl SUPPLIES Please dispense lancets for Accu Check Polina. Patient to check sugars 4 times daily        Blood Glucose Monitoring Suppl (Misc) Blood Glucose Monitoring Suppl SUPPLIES Please dispense test strips for True Metrix glucometer. Patient to test 4 times daily        Ferrous Sulfate (Tab) ferrous sulfate 325 (65 FE) MG Take 325 mg by mouth every day.        Glucose Blood (Strip) glucose blood  1 Strip by Other route 4 times a day.        Glucose Blood (Strip) glucose blood  Patient to check blood sugar 4 times a day.        Insulin Syringe-Needle U-100 (Misc) Insulin Syringe-Needle U-100 29G X 1/2\" 0.5 ML Patient to inject insulin at bed time        Insulin Syringe-Needle U-100 (Misc) INSULIN SYRINGE .5CC/29G 29G X 1/2\" 0.5 ML 10 Units by Does not apply route every day at 6 PM.        Lancets (Misc) Lancets  Ultra fine Lancets order: Lancets for patient to check blood sugars 4 times daily.        Norethindrone (Tab) MICRONOR 0.35 MG Take 1 Tab by mouth every day.        Prenatal Vitamins (Tab) Prenatal Vitamins (DIS) Take 1 Each by mouth every day.        .                 Medicines prescribed today were sent to:     [facility] - " [place], [place] - [address]    [address] [place] [address]    Phone: [phone] Fax: [phone]    Open 24 Hours?: No      Medication refill instructions:       If your prescription bottle indicates you have medication refills left, it is not necessary to call your provider’s office. Please contact your pharmacy and they will refill your medication.    If your prescription bottle indicates you do not have any refills left, you may request refills at any time through one of the following ways: The online [employer] system (except Urgent Care), by calling your provider’s office, or by asking your pharmacy to contact your provider’s office with a refill request. Medication refills are processed only during regular business hours and may not be available until the next business day. Your provider may request additional information or to have a follow-up visit with you prior to refilling your medication.   *Please Note: Medication refills are assigned a new Rx number when refilled electronically. Your pharmacy may indicate that no refills were authorized even though a new prescription for the same medication is available at the pharmacy. Please request the medicine by name with the pharmacy before contacting your provider for a refill.           Deltasighthart Status: Patient Declined

## 2017-03-23 NOTE — PROGRESS NOTES
Patient reports no bleeding, no cramping, no leaking, no contractions     Fetal movement normal     Uterine size: S=D     Fetal presentation: Cephalic     EFW:Average     Pelvic Exam:         Dilation: 1 finger        Effacement: 25%        Station: -3        Consistency: Firm        Position: Posterior    Assessment:  Pregnancy at 35 weeks                 Counseling and Plan:     Plans for delivery: Vaginal anticipated     Beta strep culture: done     L&D discussion: symptoms of labor, rupture of membranes, hospital length of stay     Fetal testing discussed     Follow-up: 1 Week

## 2017-03-24 LAB — GP B STREP DNA SPEC QL NAA+PROBE: NEGATIVE

## 2017-03-27 ENCOUNTER — ROUTINE PRENATAL (OUTPATIENT)
Dept: OBGYN | Facility: CLINIC | Age: 37
End: 2017-03-27
Payer: MEDICAID

## 2017-03-27 DIAGNOSIS — O24.419 GDM, CLASS A2: ICD-10-CM

## 2017-03-27 LAB
NST ACOUSTIC STIMULATION: NO
NST ACTION NECESSARY: NORMAL
NST ASSESSMENT: REACTIVE
NST BASELINE: 130
NST INDICATIONS: NORMAL
NST OTHER DATA: NORMAL
NST READ BY: NORMAL
NST RETURN: NORMAL
NST UTERINE ACTIVITY: NORMAL

## 2017-03-27 PROCEDURE — 59025 FETAL NON-STRESS TEST: CPT | Performed by: OBSTETRICS & GYNECOLOGY

## 2017-03-27 NOTE — MR AVS SNAPSHOT
Lexy Olivares   3/27/2017 3:00 PM   Routine Prenatal   MRN: [id number]    [facility]   Dept Phone:  [phone]    Description:  Female : 1980   Provider:  Sosa Burger M.D.           Allergies as of 3/27/2017     No Known Allergies      You were diagnosed with     GDM, class A2   [183152]         Vital Signs     Last Menstrual Period Smoking Status                2016 (Exact Date) Never Smoker           Basic Information     Date Of Birth Sex Race Ethnicity Preferred Language    1980 Female  or   Origin (Samoan,Italian,Iranian,Serbian, etc) Samoan      Your appointments     Mar 30, 2017  1:30 PM   Fetal Non-Stress Test with PC NST   [facility])    [address] [place] [address] [address]  [place] [address]   [phone]            Mar 30, 2017  2:15 PM   Diabetic with PC MD   [facility] [address]  [place] [address]   [phone]            2017  2:30 PM   Fetal Non-Stress Test with PC NST   [facility])    [address] [place] [address] [address]  [place] [address]   [phone]              Problem List              ICD-10-CM Priority Class Noted - Resolved    Supervision of high risk pregnancy in second trimester O09.92   10/17/2016 - Present    History of GDMA2 x 3 - early 1hr GTT ordered O09.292, Z86.32   10/17/2016 - Present    GDM, class A2 O24.414   2017 - Present      Health Maintenance        Date Due Completion Dates    IMM INFLUENZA (1) 2016 10/21/2014    PAP SMEAR 10/17/2019 10/17/2016, 2014    IMM DTaP/Tdap/Td Vaccine (3 - Td) 2027, 2014            Results       Current Immunizations     Influenza Vaccine Quad Inj (Pf) 10/21/2014    Tdap Vaccine 2017, 2014  9:16 AM      Below and/or attached are the medications your provider expects you to take. Review all of your home medications and newly ordered medications with your  "provider and/or pharmacist. Follow medication instructions as directed by your provider and/or pharmacist. Please keep your medication list with you and share with your provider. Update the information when medications are discontinued, doses are changed, or new medications (including over-the-counter products) are added; and carry medication information at all times in the event of emergency situations     Allergies:  No Known Allergies          Medications  Valid as of: March 27, 2017 -  3:01 PM    Generic Name Brand Name Tablet Size Instructions for use    Blood Glucose Monitoring Suppl (Misc) Blood Glucose Monitoring Suppl SUPPLIES Test strips order: Test strips for FREESTYLE LITE. Please dispense for patient to check blood sugars 4 times daily.        Blood Glucose Monitoring Suppl (Misc) Blood Glucose Monitoring Suppl SUPPLIES Please dispense test strips for Accu Check Polina. Patient to check sugars 4 times daily        Blood Glucose Monitoring Suppl (Misc) Blood Glucose Monitoring Suppl SUPPLIES Please dispense lancets for Accu Check Polina. Patient to check sugars 4 times daily        Blood Glucose Monitoring Suppl (Misc) Blood Glucose Monitoring Suppl SUPPLIES Please dispense test strips for True Metrix glucometer. Patient to test 4 times daily        Ferrous Sulfate (Tab) ferrous sulfate 325 (65 FE) MG Take 325 mg by mouth every day.        Glucose Blood (Strip) glucose blood  1 Strip by Other route 4 times a day.        Glucose Blood (Strip) glucose blood  Patient to check blood sugar 4 times a day.        Insulin Syringe-Needle U-100 (Misc) Insulin Syringe-Needle U-100 29G X 1/2\" 0.5 ML Patient to inject insulin at bed time        Insulin Syringe-Needle U-100 (Misc) INSULIN SYRINGE .5CC/29G 29G X 1/2\" 0.5 ML 10 Units by Does not apply route every day at 6 PM.        Lancets (Misc) Lancets  Ultra fine Lancets order: Lancets for patient to check blood sugars 4 times daily.        Norethindrone (Tab) " MICRONOR 0.35 MG Take 1 Tab by mouth every day.        Prenatal Vitamins (Tab) Prenatal Vitamins (DIS) Take 1 Each by mouth every day.        .                 Medicines prescribed today were sent to:     [facility] - [place], [place] - [address] [place] [address]    Phone: [phone] Fax: [phone]    Open 24 Hours?: No      Medication refill instructions:       If your prescription bottle indicates you have medication refills left, it is not necessary to call your provider’s office. Please contact your pharmacy and they will refill your medication.    If your prescription bottle indicates you do not have any refills left, you may request refills at any time through one of the following ways: The online SecureAlert system (except Urgent Care), by calling your provider’s office, or by asking your pharmacy to contact your provider’s office with a refill request. Medication refills are processed only during regular business hours and may not be available until the next business day. Your provider may request additional information or to have a follow-up visit with you prior to refilling your medication.   *Please Note: Medication refills are assigned a new Rx number when refilled electronically. Your pharmacy may indicate that no refills were authorized even though a new prescription for the same medication is available at the pharmacy. Please request the medicine by name with the pharmacy before contacting your provider for a refill.           CommonTimehart Status: Patient Declined

## 2017-03-30 ENCOUNTER — ROUTINE PRENATAL (OUTPATIENT)
Dept: OBGYN | Facility: CLINIC | Age: 37
End: 2017-03-30

## 2017-03-30 ENCOUNTER — APPOINTMENT (OUTPATIENT)
Dept: RADIOLOGY | Facility: MEDICAL CENTER | Age: 37
End: 2017-03-30
Attending: OBSTETRICS & GYNECOLOGY
Payer: MEDICAID

## 2017-03-30 VITALS — WEIGHT: 254 LBS | SYSTOLIC BLOOD PRESSURE: 104 MMHG | DIASTOLIC BLOOD PRESSURE: 64 MMHG | BODY MASS INDEX: 48.02 KG/M2

## 2017-03-30 DIAGNOSIS — O09.292 HISTORY OF GESTATIONAL DIABETES IN PRIOR PREGNANCY, CURRENTLY PREGNANT IN SECOND TRIMESTER: ICD-10-CM

## 2017-03-30 DIAGNOSIS — Z86.32 HISTORY OF GESTATIONAL DIABETES IN PRIOR PREGNANCY, CURRENTLY PREGNANT IN SECOND TRIMESTER: ICD-10-CM

## 2017-03-30 DIAGNOSIS — O24.419 GDM, CLASS A2: ICD-10-CM

## 2017-03-30 DIAGNOSIS — O09.92 SUPERVISION OF HIGH RISK PREGNANCY IN SECOND TRIMESTER: ICD-10-CM

## 2017-03-30 LAB
APPEARANCE UR: NORMAL
BILIRUB UR STRIP-MCNC: NORMAL MG/DL
COLOR UR AUTO: NORMAL
GLUCOSE UR STRIP.AUTO-MCNC: NEGATIVE MG/DL
KETONES UR STRIP.AUTO-MCNC: NEGATIVE MG/DL
LEUKOCYTE ESTERASE UR QL STRIP.AUTO: NORMAL
NITRITE UR QL STRIP.AUTO: NEGATIVE
NST ACOUSTIC STIMULATION: YES
NST ACTION NECESSARY: ABNORMAL
NST ASSESSMENT: NON REACTIVE
NST BASELINE: 145
NST INDICATIONS: ABNORMAL
NST OTHER DATA: ABNORMAL
NST READ BY: ABNORMAL
NST RETURN: ABNORMAL
NST UTERINE ACTIVITY: NO
PH UR STRIP.AUTO: 7.5 [PH] (ref 5–8)
PROT UR QL STRIP: 30 MG/DL
RBC UR QL AUTO: NEGATIVE
SP GR UR STRIP.AUTO: 1.01
UROBILINOGEN UR STRIP-MCNC: NORMAL MG/DL

## 2017-03-30 PROCEDURE — 76819 FETAL BIOPHYS PROFIL W/O NST: CPT

## 2017-03-30 PROCEDURE — 59025 FETAL NON-STRESS TEST: CPT | Performed by: OBSTETRICS & GYNECOLOGY

## 2017-03-30 PROCEDURE — 81002 URINALYSIS NONAUTO W/O SCOPE: CPT | Performed by: OBSTETRICS & GYNECOLOGY

## 2017-03-30 PROCEDURE — 90040 PR PRENATAL FOLLOW UP: CPT | Performed by: OBSTETRICS & GYNECOLOGY

## 2017-03-30 NOTE — PROGRESS NOTES
Pt. Here for OB/FU GDM and NST today. Reports Good FM.   Good # [phone]  Pt states no complaints.    Pharmacy verified.

## 2017-03-30 NOTE — MR AVS SNAPSHOT
Lexy Cyril Olivares   3/30/2017 1:30 PM   Routine Prenatal   MRN: [id number]    [facility]   Dept Phone:  [phone]    Description:  Female : 1980   Provider:  VINH BREEN           Allergies as of 3/30/2017     No Known Allergies      You were diagnosed with     GDM, class A2   [180739]         Vital Signs     Last Menstrual Period Smoking Status                2016 (Exact Date) Never Smoker           Basic Information     Date Of Birth Sex Race Ethnicity Preferred Language    1980 Female  or   Origin (Japanese,Zambian,Uruguayan,Reynaldo, etc) Japanese      Your appointments     Mar 30, 2017  1:30 PM   Fetal Non-Stress Test with PC NST   [facility])    [address] [place] [address] [address]  [place] [address]   [phone]            Mar 30, 2017  2:15 PM   Diabetic with Speedy Castelan M.D.   [facility])    [address] [place] [address] [address]  [place] [address]   [phone]            2017  2:30 PM   Fetal Non-Stress Test with PC NST   [facility])    [address] [facility] [address]  [place] [address]   [phone]              Problem List              ICD-10-CM Priority Class Noted - Resolved    Supervision of high risk pregnancy in second trimester O09.92   10/17/2016 - Present    History of GDMA2 x 3 - early 1hr GTT ordered O09.292, Z86.32   10/17/2016 - Present    GDM, class A2 O24.414   2017 - Present      Health Maintenance        Date Due Completion Dates    IMM INFLUENZA (1) 2016 10/21/2014    PAP SMEAR 10/17/2019 10/17/2016, 2014    IMM DTaP/Tdap/Td Vaccine (3 - Td) 2027, 2014            Results       Current Immunizations     Influenza Vaccine Quad Inj (Pf) 10/21/2014    Tdap Vaccine 2017, 2014  9:16 AM      Below and/or attached are the medications your provider expects you to take. Review all of your home medications and newly ordered medications with your  "provider and/or pharmacist. Follow medication instructions as directed by your provider and/or pharmacist. Please keep your medication list with you and share with your provider. Update the information when medications are discontinued, doses are changed, or new medications (including over-the-counter products) are added; and carry medication information at all times in the event of emergency situations     Allergies:  No Known Allergies          Medications  Valid as of: March 30, 2017 - 11:52 AM    Generic Name Brand Name Tablet Size Instructions for use    Blood Glucose Monitoring Suppl (Misc) Blood Glucose Monitoring Suppl SUPPLIES Test strips order: Test strips for FREESTYLE LITE. Please dispense for patient to check blood sugars 4 times daily.        Blood Glucose Monitoring Suppl (Misc) Blood Glucose Monitoring Suppl SUPPLIES Please dispense test strips for Accu Check Polina. Patient to check sugars 4 times daily        Blood Glucose Monitoring Suppl (Misc) Blood Glucose Monitoring Suppl SUPPLIES Please dispense lancets for Accu Check Polina. Patient to check sugars 4 times daily        Blood Glucose Monitoring Suppl (Misc) Blood Glucose Monitoring Suppl SUPPLIES Please dispense test strips for True Metrix glucometer. Patient to test 4 times daily        Ferrous Sulfate (Tab) ferrous sulfate 325 (65 FE) MG Take 325 mg by mouth every day.        Glucose Blood (Strip) glucose blood  1 Strip by Other route 4 times a day.        Glucose Blood (Strip) glucose blood  Patient to check blood sugar 4 times a day.        Insulin Syringe-Needle U-100 (Misc) Insulin Syringe-Needle U-100 29G X 1/2\" 0.5 ML Patient to inject insulin at bed time        Insulin Syringe-Needle U-100 (Misc) INSULIN SYRINGE .5CC/29G 29G X 1/2\" 0.5 ML 10 Units by Does not apply route every day at 6 PM.        Lancets (Misc) Lancets  Ultra fine Lancets order: Lancets for patient to check blood sugars 4 times daily.        Norethindrone (Tab) " MICRONOR 0.35 MG Take 1 Tab by mouth every day.        Prenatal Vitamins (Tab) Prenatal Vitamins (DIS) Take 1 Each by mouth every day.        .                 Medicines prescribed today were sent to:     [facility] - [place], [place] - [address] [place] [address]    Phone: [phone] Fax: [phone]    Open 24 Hours?: No      Medication refill instructions:       If your prescription bottle indicates you have medication refills left, it is not necessary to call your provider’s office. Please contact your pharmacy and they will refill your medication.    If your prescription bottle indicates you do not have any refills left, you may request refills at any time through one of the following ways: The online [employer] system (except Urgent Care), by calling your provider’s office, or by asking your pharmacy to contact your provider’s office with a refill request. Medication refills are processed only during regular business hours and may not be available until the next business day. Your provider may request additional information or to have a follow-up visit with you prior to refilling your medication.   *Please Note: Medication refills are assigned a new Rx number when refilled electronically. Your pharmacy may indicate that no refills were authorized even though a new prescription for the same medication is available at the pharmacy. Please request the medicine by name with the pharmacy before contacting your provider for a refill.           investUPhart Status: Patient Declined

## 2017-03-30 NOTE — MR AVS SNAPSHOT
Lexy Olivares   3/30/2017 2:15 PM   Routine Prenatal   MRN: [id number]    [facility]   Dept Phone:  [phone]    Description:  Female : 1980   Provider:  Speedy Castelan M.D.           Allergies as of 3/30/2017     No Known Allergies      You were diagnosed with     Supervision of high risk pregnancy in second trimester   [931696]       History of gestational diabetes in prior pregnancy, currently pregnant in second trimester   [2197922]       GDM, class A2   [195533]         Vital Signs     Blood Pressure Weight Last Menstrual Period Smoking Status          104/64 mmHg 115.214 kg (254 lb) 2016 (Exact Date) Never Smoker         Basic Information     Date Of Birth Sex Race Ethnicity Preferred Language    1980 Female  or   Origin (Cymro,Brazilian,Sudanese,Reynaldo, etc) Cymro      Your appointments     Mar 30, 2017  1:30 PM   Fetal Non-Stress Test with Speedy Castelan M.D.   [facility])    [address] [place] [address] [address]  [place] [address]   [phone]            Mar 30, 2017  2:15 PM   Diabetic with Speedy Castelan M.D.   [facility])    [address] [place] [address] [address]  [place] [address]   [phone]            2017  2:30 PM   Fetal Non-Stress Test with PC NST   [facility] [address]  [place] [address]   [phone]              Problem List              ICD-10-CM Priority Class Noted - Resolved    Supervision of high risk pregnancy in second trimester O09.92   10/17/2016 - Present    History of GDMA2 x 3 - early 1hr GTT ordered O09.292, Z86.32   10/17/2016 - Present    GDM, class A2 O24.414   2017 - Present      Health Maintenance        Date Due Completion Dates    IMM INFLUENZA (1) 2016 10/21/2014    PAP SMEAR 10/17/2019 10/17/2016, 2014    IMM DTaP/Tdap/Td Vaccine (3 - Td) 2027, 2014            Results     POCT Urinalysis     Component Value Standard Range & Units    POC Color  Negative    POC Appearance  Negative    POC Leukocyte Esterase large Negative    POC Nitrites negative Negative    POC Urobiligen  Negative (0.2) mg/dL    POC Protein 30 Negative mg/dL    POC Urine PH 7.5 5.0 - 8.0    POC Blood negative Negative    POC Specific Gravity 1.010 <1.005 - >1.030    POC Ketones negative Negative mg/dL    POC Biliruben  Negative mg/dL    POC Glucose negative Negative mg/dL                        Current Immunizations     Influenza Vaccine Quad Inj (Pf) 10/21/2014    Tdap Vaccine 2/2/2017, 12/18/2014  9:16 AM      Below and/or attached are the medications your provider expects you to take. Review all of your home medications and newly ordered medications with your provider and/or pharmacist. Follow medication instructions as directed by your provider and/or pharmacist. Please keep your medication list with you and share with your provider. Update the information when medications are discontinued, doses are changed, or new medications (including over-the-counter products) are added; and carry medication information at all times in the event of emergency situations     Allergies:  No Known Allergies          Medications  Valid as of: March 30, 2017 - 11:52 AM    Generic Name Brand Name Tablet Size Instructions for use    Blood Glucose Monitoring Suppl (Misc) Blood Glucose Monitoring Suppl SUPPLIES Test strips order: Test strips for FREESTYLE LITE. Please dispense for patient to check blood sugars 4 times daily.        Blood Glucose Monitoring Suppl (Misc) Blood Glucose Monitoring Suppl SUPPLIES Please dispense test strips for Accu Check Polina. Patient to check sugars 4 times daily        Blood Glucose Monitoring Suppl (Misc) Blood Glucose Monitoring Suppl SUPPLIES Please dispense lancets for Accu Check Polina. Patient to check sugars 4 times daily        Blood Glucose Monitoring Suppl (Misc) Blood Glucose Monitoring Suppl SUPPLIES Please  "dispense test strips for True Metrix glucometer. Patient to test 4 times daily        Ferrous Sulfate (Tab) ferrous sulfate 325 (65 FE) MG Take 325 mg by mouth every day.        Glucose Blood (Strip) glucose blood  1 Strip by Other route 4 times a day.        Glucose Blood (Strip) glucose blood  Patient to check blood sugar 4 times a day.        Insulin Syringe-Needle U-100 (Misc) Insulin Syringe-Needle U-100 29G X 1/2\" 0.5 ML Patient to inject insulin at bed time        Insulin Syringe-Needle U-100 (Misc) INSULIN SYRINGE .5CC/29G 29G X 1/2\" 0.5 ML 10 Units by Does not apply route every day at 6 PM.        Lancets (Misc) Lancets  Ultra fine Lancets order: Lancets for patient to check blood sugars 4 times daily.        Norethindrone (Tab) MICRONOR 0.35 MG Take 1 Tab by mouth every day.        Prenatal Vitamins (Tab) Prenatal Vitamins (DIS) Take 1 Each by mouth every day.        .                 Medicines prescribed today were sent to:     [facility] [address] [place] [address] [address] [facility] [place] [address]    Phone: [phone] Fax: [phone]    Open 24 Hours?: No      Medication refill instructions:       If your prescription bottle indicates you have medication refills left, it is not necessary to call your provider’s office. Please contact your pharmacy and they will refill your medication.    If your prescription bottle indicates you do not have any refills left, you may request refills at any time through one of the following ways: The online eCollect system (except Urgent Care), by calling your provider’s office, or by asking your pharmacy to contact your provider’s office with a refill request. Medication refills are processed only during regular business hours and may not be available until the next business day. Your provider may request additional information or to have a follow-up visit with you prior to refilling your medication.   *Please Note: Medication refills are assigned a new Rx " number when refilled electronically. Your pharmacy may indicate that no refills were authorized even though a new prescription for the same medication is available at the pharmacy. Please request the medicine by name with the pharmacy before contacting your provider for a refill.           MyChart Status: Patient Declined

## 2017-04-06 ENCOUNTER — ROUTINE PRENATAL (OUTPATIENT)
Dept: OBGYN | Facility: CLINIC | Age: 37
End: 2017-04-06

## 2017-04-06 VITALS — DIASTOLIC BLOOD PRESSURE: 72 MMHG | BODY MASS INDEX: 48.96 KG/M2 | WEIGHT: 259 LBS | SYSTOLIC BLOOD PRESSURE: 116 MMHG

## 2017-04-06 DIAGNOSIS — O09.292 HISTORY OF GESTATIONAL DIABETES IN PRIOR PREGNANCY, CURRENTLY PREGNANT IN SECOND TRIMESTER: ICD-10-CM

## 2017-04-06 DIAGNOSIS — Z86.32 HISTORY OF GESTATIONAL DIABETES IN PRIOR PREGNANCY, CURRENTLY PREGNANT IN SECOND TRIMESTER: ICD-10-CM

## 2017-04-06 DIAGNOSIS — O24.419 GDM, CLASS A2: ICD-10-CM

## 2017-04-06 DIAGNOSIS — O09.92 SUPERVISION OF HIGH RISK PREGNANCY IN SECOND TRIMESTER: ICD-10-CM

## 2017-04-06 LAB
APPEARANCE UR: NORMAL
BILIRUB UR STRIP-MCNC: NORMAL MG/DL
COLOR UR AUTO: NORMAL
GLUCOSE UR STRIP.AUTO-MCNC: NEGATIVE MG/DL
KETONES UR STRIP.AUTO-MCNC: NEGATIVE MG/DL
LEUKOCYTE ESTERASE UR QL STRIP.AUTO: NEGATIVE
NITRITE UR QL STRIP.AUTO: NEGATIVE
NST ACOUSTIC STIMULATION: YES
NST ACTION NECESSARY: NORMAL
NST ASSESSMENT: REACTIVE
NST BASELINE: 130
NST INDICATIONS: NORMAL
NST OTHER DATA: NORMAL
NST READ BY: NORMAL
NST RETURN: NORMAL
NST UTERINE ACTIVITY: NO
PH UR STRIP.AUTO: 6 [PH] (ref 5–8)
PROT UR QL STRIP: NORMAL MG/DL
RBC UR QL AUTO: NEGATIVE
SP GR UR STRIP.AUTO: 1.02
UROBILINOGEN UR STRIP-MCNC: NORMAL MG/DL

## 2017-04-06 PROCEDURE — 59025 FETAL NON-STRESS TEST: CPT | Performed by: OBSTETRICS & GYNECOLOGY

## 2017-04-06 PROCEDURE — 81002 URINALYSIS NONAUTO W/O SCOPE: CPT | Performed by: OBSTETRICS & GYNECOLOGY

## 2017-04-06 PROCEDURE — 90040 PR PRENATAL FOLLOW UP: CPT | Performed by: OBSTETRICS & GYNECOLOGY

## 2017-04-06 NOTE — MR AVS SNAPSHOT
Lexy Olivares   2017 2:30 PM   Routine Prenatal   MRN: [id number]    [facility]   Dept Phone:  [phone]    Description:  Female : 1980   Provider:  Sosa Burger M.D.           Allergies as of 2017     No Known Allergies      You were diagnosed with     GDM, class A2   [365101]         Vital Signs     Last Menstrual Period Smoking Status                2016 (Exact Date) Never Smoker           Basic Information     Date Of Birth Sex Race Ethnicity Preferred Language    1980 Female  or   Origin (New Zealander,Kuwaiti,Bruneian,Malaysian, etc) [place]alander      Your appointments     Apr 10, 2017  2:30 PM   Fetal Non-Stress Test with PC NST   [facility] [address]  [place] [address]   [phone]            2017  2:30 PM   Fetal Non-Stress Test with PC NST   [facility] [address]  [place] [id number] [phone]            2017  3:00 PM   Diabetic with PC MD   [facility] [address]  [place] [address] [phone]              Problem List              ICD-10-CM Priority Class Noted - Resolved    Supervision of high risk pregnancy in second trimester O09.92   10/17/2016 - Present    History of GDMA2 x 3 - early 1hr GTT ordered O09.292, Z86.32   10/17/2016 - Present    GDM, class A2 O24.414   2017 - Present      Health Maintenance        Date Due Completion Dates    PAP SMEAR 10/17/2019 10/17/2016, 2014    IMM DTaP/Tdap/Td Vaccine (3 - Td) 2027, 2014            Results     POCT Fetal Nonstress Test      Component    NST Indications    gdm    NST Baseline    130    NST Uterine Activity    no    NST Acoustic Stimulation    yes    NST Assessment    reactive    NST Action Necessary    NST Other Data    NST Return    NST Read By                        Current Immunizations     Influenza Vaccine  "Quad Inj (Pf) 10/21/2014    Tdap Vaccine 2/2/2017, 12/18/2014  9:16 AM      Below and/or attached are the medications your provider expects you to take. Review all of your home medications and newly ordered medications with your provider and/or pharmacist. Follow medication instructions as directed by your provider and/or pharmacist. Please keep your medication list with you and share with your provider. Update the information when medications are discontinued, doses are changed, or new medications (including over-the-counter products) are added; and carry medication information at all times in the event of emergency situations     Allergies:  No Known Allergies          Medications  Valid as of: April 06, 2017 -  4:25 PM    Generic Name Brand Name Tablet Size Instructions for use    Blood Glucose Monitoring Suppl (Misc) Blood Glucose Monitoring Suppl SUPPLIES Test strips order: Test strips for FREESTYLE LITE. Please dispense for patient to check blood sugars 4 times daily.        Blood Glucose Monitoring Suppl (Misc) Blood Glucose Monitoring Suppl SUPPLIES Please dispense test strips for Accu Check Polina. Patient to check sugars 4 times daily        Blood Glucose Monitoring Suppl (Misc) Blood Glucose Monitoring Suppl SUPPLIES Please dispense lancets for Accu Check Polina. Patient to check sugars 4 times daily        Blood Glucose Monitoring Suppl (Misc) Blood Glucose Monitoring Suppl SUPPLIES Please dispense test strips for True Metrix glucometer. Patient to test 4 times daily        Ferrous Sulfate (Tab) ferrous sulfate 325 (65 FE) MG Take 325 mg by mouth every day.        Glucose Blood (Strip) glucose blood  1 Strip by Other route 4 times a day.        Glucose Blood (Strip) glucose blood  Patient to check blood sugar 4 times a day.        Insulin Syringe-Needle U-100 (Misc) Insulin Syringe-Needle U-100 29G X 1/2\" 0.5 ML Patient to inject insulin at bed time        Insulin Syringe-Needle U-100 (Misc) INSULIN " "SYRINGE .5CC/29G 29G X 1/2\" 0.5 ML 10 Units by Does not apply route every day at 6 PM.        Lancets (Misc) Lancets  Ultra fine Lancets order: Lancets for patient to check blood sugars 4 times daily.        Norethindrone (Tab) MICRONOR 0.35 MG Take 1 Tab by mouth every day.        Prenatal Vitamins (Tab) Prenatal Vitamins (DIS) Take 1 Each by mouth every day.        .                 Medicines prescribed today were sent to:     [facility], [place] - [address] [place] [address]    Phone: [phone] Fax: [phone]    Open 24 Hours?: No      Medication refill instructions:       If your prescription bottle indicates you have medication refills left, it is not necessary to call your provider’s office. Please contact your pharmacy and they will refill your medication.    If your prescription bottle indicates you do not have any refills left, you may request refills at any time through one of the following ways: The online [employer] system (except Urgent Care), by calling your provider’s office, or by asking your pharmacy to contact your provider’s office with a refill request. Medication refills are processed only during regular business hours and may not be available until the next business day. Your provider may request additional information or to have a follow-up visit with you prior to refilling your medication.   *Please Note: Medication refills are assigned a new Rx number when refilled electronically. Your pharmacy may indicate that no refills were authorized even though a new prescription for the same medication is available at the pharmacy. Please request the medicine by name with the pharmacy before contacting your provider for a refill.           MyChart Status: Patient Declined        "

## 2017-04-06 NOTE — MR AVS SNAPSHOT
Lexy Olivares   2017 3:00 PM   Routine Prenatal   MRN: [id number]    [facility]   Dept Phone:  [phone]    Description:  Female : 1980   Provider:  Sosa Burger M.D.           Allergies as of 2017     No Known Allergies      You were diagnosed with     GDM, class A2   [156465]       Supervision of high risk pregnancy in second trimester   [435511]       History of gestational diabetes in prior pregnancy, currently pregnant in second trimester   [2414948]         Vital Signs     Blood Pressure Weight Last Menstrual Period Smoking Status          116/72 mmHg 117.482 kg (259 lb) 2016 (Exact Date) Never Smoker         Basic Information     Date Of Birth Sex Race Ethnicity Preferred Language    1980 Female  or   Origin (Cypriot,Togolese,St Helenian,Reynaldo, etc) Cypriot      Your appointments     Apr 10, 2017  2:30 PM   Fetal Non-Stress Test with PC NST   [facility] [address]  [facility] [address]   [phone]            2017  2:30 PM   Fetal Non-Stress Test with PC NST   [facility]    [address] [place] [address] [address]  [place] [id number] [phone]            2017  3:00 PM   Diabetic with PC MD   [facility] [address]  [place] [address]   [phone]              Problem List              ICD-10-CM Priority Class Noted - Resolved    Supervision of high risk pregnancy in second trimester O09.92   10/17/2016 - Present    History of GDMA2 x 3 - early 1hr GTT ordered O09.292, Z86.32   10/17/2016 - Present    GDM, class A2 O24.414   2017 - Present      Health Maintenance        Date Due Completion Dates    PAP SMEAR 10/17/2019 10/17/2016, 2014    IMM DTaP/Tdap/Td Vaccine (3 - Td) 2027, 2014            Results     POCT Urinalysis      Component Value Standard Range & Units    POC Color  Negative    POC  Appearance  Negative    POC Leukocyte Esterase negative Negative    POC Nitrites negative Negative    POC Urobiligen  Negative (0.2) mg/dL    POC Protein 1+ Negative mg/dL    POC Urine PH 6.0 5.0 - 8.0    POC Blood negative Negative    POC Specific Gravity 1.020 <1.005 - >1.030    POC Ketones negative Negative mg/dL    POC Biliruben  Negative mg/dL    POC Glucose negative Negative mg/dL                        Current Immunizations     Influenza Vaccine Quad Inj (Pf) 10/21/2014    Tdap Vaccine 2/2/2017, 12/18/2014  9:16 AM      Below and/or attached are the medications your provider expects you to take. Review all of your home medications and newly ordered medications with your provider and/or pharmacist. Follow medication instructions as directed by your provider and/or pharmacist. Please keep your medication list with you and share with your provider. Update the information when medications are discontinued, doses are changed, or new medications (including over-the-counter products) are added; and carry medication information at all times in the event of emergency situations     Allergies:  No Known Allergies          Medications  Valid as of: April 06, 2017 -  4:25 PM    Generic Name Brand Name Tablet Size Instructions for use    Blood Glucose Monitoring Suppl (Misc) Blood Glucose Monitoring Suppl SUPPLIES Test strips order: Test strips for FREESTYLE LITE. Please dispense for patient to check blood sugars 4 times daily.        Blood Glucose Monitoring Suppl (Misc) Blood Glucose Monitoring Suppl SUPPLIES Please dispense test strips for Accu Check Polina. Patient to check sugars 4 times daily        Blood Glucose Monitoring Suppl (Misc) Blood Glucose Monitoring Suppl SUPPLIES Please dispense lancets for Accu Check Polina. Patient to check sugars 4 times daily        Blood Glucose Monitoring Suppl (Misc) Blood Glucose Monitoring Suppl SUPPLIES Please dispense test strips for True Metrix glucometer. Patient to test 4  "times daily        Ferrous Sulfate (Tab) ferrous sulfate 325 (65 FE) MG Take 325 mg by mouth every day.        Glucose Blood (Strip) glucose blood  1 Strip by Other route 4 times a day.        Glucose Blood (Strip) glucose blood  Patient to check blood sugar 4 times a day.        Insulin Syringe-Needle U-100 (Misc) Insulin Syringe-Needle U-100 29G X 1/2\" 0.5 ML Patient to inject insulin at bed time        Insulin Syringe-Needle U-100 (Misc) INSULIN SYRINGE .5CC/29G 29G X 1/2\" 0.5 ML 10 Units by Does not apply route every day at 6 PM.        Lancets (Misc) Lancets  Ultra fine Lancets order: Lancets for patient to check blood sugars 4 times daily.        Norethindrone (Tab) MICRONOR 0.35 MG Take 1 Tab by mouth every day.        Prenatal Vitamins (Tab) Prenatal Vitamins (DIS) Take 1 Each by mouth every day.        .                 Medicines prescribed today were sent to:     [facility] [address] [place], [place] [address] [address] [place], [place] [address] [address] [place] [address]    Phone: [phone] Fax: [phone]    Open 24 Hours?: No      Medication refill instructions:       If your prescription bottle indicates you have medication refills left, it is not necessary to call your provider’s office. Please contact your pharmacy and they will refill your medication.    If your prescription bottle indicates you do not have any refills left, you may request refills at any time through one of the following ways: The online [employer] system (except Urgent Care), by calling your provider’s office, or by asking your pharmacy to contact your provider’s office with a refill request. Medication refills are processed only during regular business hours and may not be available until the next business day. Your provider may request additional information or to have a follow-up visit with you prior to refilling your medication.   *Please Note: Medication refills are assigned a new Rx number when refilled electronically. Your pharmacy may indicate that " no refills were authorized even though a new prescription for the same medication is available at the pharmacy. Please request the medicine by name with the pharmacy before contacting your provider for a refill.        Your To Do List     Future Labs/Procedures Complete By Expires    INDUCTION OF LABOR  As directed 4/6/2018         MyChart Status: Patient Declined

## 2017-04-06 NOTE — PROGRESS NOTES
Lexy Olivares 36 y.o.  37w2d here today for obstetrical visit. Patient reports good  fetal movement. denies contractions, denies vaginal bleeding, denies loss of fluid.    Pregnancy is complicated by   Patient Active Problem List    Diagnosis Date Noted   • GDM, class A2 2017   • Supervision of high risk pregnancy in second trimester 10/17/2016   • History of GDMA2 x 3 - early 1hr GTT ordered 10/17/2016       GBS done  Fasting blood sugars range <90  Postprandial blood sugars range <140    Needs IOL    Insulin regimen  NPH a.m. 7 , regular a.m.4  Regular with dinner 0  NPH at bedtime 18     Followup in 1 weeks   labor precautions are reviewed  Continue kick counts daily after 28 weeks  NST twice weekly after 32 weeks if on insulin

## 2017-04-06 NOTE — PROGRESS NOTES
Pt here today for OB follow up / Dibetic  Reports +FM  WT: 259 lb  BP: 116/72  Pt states no complaints today.  Preferred pharmacy vitrified with pt  Good # [phone]

## 2017-04-10 ENCOUNTER — ROUTINE PRENATAL (OUTPATIENT)
Dept: OBGYN | Facility: CLINIC | Age: 37
End: 2017-04-10

## 2017-04-10 DIAGNOSIS — O24.419 GDM, CLASS A2: ICD-10-CM

## 2017-04-10 LAB
NST ACOUSTIC STIMULATION: NO
NST ACTION NECESSARY: NORMAL
NST ASSESSMENT: NORMAL
NST BASELINE: 140
NST INDICATIONS: NORMAL
NST OTHER DATA: NORMAL
NST READ BY: NORMAL
NST RETURN: NORMAL
NST UTERINE ACTIVITY: NO

## 2017-04-10 PROCEDURE — 59025 FETAL NON-STRESS TEST: CPT | Performed by: OBSTETRICS & GYNECOLOGY

## 2017-04-13 ENCOUNTER — ROUTINE PRENATAL (OUTPATIENT)
Dept: OBGYN | Facility: CLINIC | Age: 37
End: 2017-04-13

## 2017-04-13 VITALS — DIASTOLIC BLOOD PRESSURE: 65 MMHG | BODY MASS INDEX: 48.77 KG/M2 | SYSTOLIC BLOOD PRESSURE: 111 MMHG | WEIGHT: 258 LBS

## 2017-04-13 DIAGNOSIS — Z86.32 HISTORY OF GESTATIONAL DIABETES IN PRIOR PREGNANCY, CURRENTLY PREGNANT IN SECOND TRIMESTER: ICD-10-CM

## 2017-04-13 DIAGNOSIS — O24.419 GDM, CLASS A2: ICD-10-CM

## 2017-04-13 DIAGNOSIS — O09.92 SUPERVISION OF HIGH RISK PREGNANCY IN SECOND TRIMESTER: ICD-10-CM

## 2017-04-13 DIAGNOSIS — O09.292 HISTORY OF GESTATIONAL DIABETES IN PRIOR PREGNANCY, CURRENTLY PREGNANT IN SECOND TRIMESTER: ICD-10-CM

## 2017-04-13 LAB
APPEARANCE UR: NORMAL
BILIRUB UR STRIP-MCNC: NORMAL MG/DL
COLOR UR AUTO: NORMAL
GLUCOSE UR STRIP.AUTO-MCNC: NORMAL MG/DL
KETONES UR STRIP.AUTO-MCNC: NORMAL MG/DL
LEUKOCYTE ESTERASE UR QL STRIP.AUTO: NORMAL
NITRITE UR QL STRIP.AUTO: NORMAL
NST ACOUSTIC STIMULATION: NO
NST ACTION NECESSARY: NORMAL
NST ASSESSMENT: REACTIVE
NST BASELINE: 125
NST INDICATIONS: NORMAL
NST OTHER DATA: NORMAL
NST READ BY: NORMAL
NST RETURN: NORMAL
NST UTERINE ACTIVITY: NO
PH UR STRIP.AUTO: 7.5 [PH] (ref 5–8)
PROT UR QL STRIP: NORMAL MG/DL
RBC UR QL AUTO: NORMAL
SP GR UR STRIP.AUTO: 1.01
UROBILINOGEN UR STRIP-MCNC: NORMAL MG/DL

## 2017-04-13 PROCEDURE — 59025 FETAL NON-STRESS TEST: CPT | Performed by: OBSTETRICS & GYNECOLOGY

## 2017-04-13 PROCEDURE — 90040 PR PRENATAL FOLLOW UP: CPT | Performed by: OBSTETRICS & GYNECOLOGY

## 2017-04-13 PROCEDURE — 81002 URINALYSIS NONAUTO W/O SCOPE: CPT | Performed by: OBSTETRICS & GYNECOLOGY

## 2017-04-13 NOTE — PROGRESS NOTES
Pt here for GDM f/u. Denies any complications today. Reports +FM.  Good phone# [phone]  Pharmacy verified with pt.

## 2017-04-13 NOTE — MR AVS SNAPSHOT
Lexy Olivares   2017 3:00 PM   Routine Prenatal   MRN: [id number]    [facility]   Dept Phone:  [phone]    Description:  Female : 1980   Provider:  Speedy Castelan M.D.           Allergies as of 2017     No Known Allergies      You were diagnosed with     Supervision of high risk pregnancy in second trimester   [964951]       History of gestational diabetes in prior pregnancy, currently pregnant in second trimester   [5833371]       GDM, class A2   [370337]         Vital Signs     Blood Pressure Weight Last Menstrual Period Smoking Status          111/65 mmHg 117.028 kg (258 lb) 2016 (Exact Date) Never Smoker         Basic Information     Date Of Birth Sex Race Ethnicity Preferred Language    1980 Female  or   Origin (Cymraes,Andorran,Tongan,Reynaldo, etc) Cymraes      Your appointments     2017  1:00 PM   Fetal Non-Stress Test with PC NST   [facility] [address] [id number] [phone]              Problem List              ICD-10-CM Priority Class Noted - Resolved    Supervision of high risk pregnancy in second trimester O09.92   10/17/2016 - Present    History of GDMA2 x 3 - early 1hr GTT ordered O09.292, Z86.32   10/17/2016 - Present    GDM, class A2 O24.414   2017 - Present      Health Maintenance        Date Due Completion Dates    PAP SMEAR 10/17/2019 10/17/2016, 2014    IMM DTaP/Tdap/Td Vaccine (3 - Td) 2027, 2014            Results     POCT Urinalysis      Component Value Standard Range & Units    POC Color  Negative    POC Appearance  Negative    POC Leukocyte Esterase Neg Negative    POC Nitrites Neg Negative    POC Urobiligen  Negative (0.2) mg/dL    POC Protein Neg Negative mg/dL    POC Urine PH 7.5 5.0 - 8.0    POC Blood Neg Negative    POC Specific Gravity 1.010 <1.005 - >1.030    POC Ketones Neg Negative mg/dL    POC  Biliruben  Negative mg/dL    POC Glucose Neg Negative mg/dL                        Current Immunizations     Influenza Vaccine Quad Inj (Pf) 10/21/2014    Tdap Vaccine 2/2/2017, 12/18/2014  9:16 AM      Below and/or attached are the medications your provider expects you to take. Review all of your home medications and newly ordered medications with your provider and/or pharmacist. Follow medication instructions as directed by your provider and/or pharmacist. Please keep your medication list with you and share with your provider. Update the information when medications are discontinued, doses are changed, or new medications (including over-the-counter products) are added; and carry medication information at all times in the event of emergency situations     Allergies:  No Known Allergies          Medications  Valid as of: April 13, 2017 -  3:34 PM    Generic Name Brand Name Tablet Size Instructions for use    Blood Glucose Monitoring Suppl (Misc) Blood Glucose Monitoring Suppl SUPPLIES Test strips order: Test strips for FREESTYLE LITE. Please dispense for patient to check blood sugars 4 times daily.        Blood Glucose Monitoring Suppl (Misc) Blood Glucose Monitoring Suppl SUPPLIES Please dispense test strips for Accu Check Polina. Patient to check sugars 4 times daily        Blood Glucose Monitoring Suppl (Misc) Blood Glucose Monitoring Suppl SUPPLIES Please dispense lancets for Accu Check Polina. Patient to check sugars 4 times daily        Blood Glucose Monitoring Suppl (Misc) Blood Glucose Monitoring Suppl SUPPLIES Please dispense test strips for True Metrix glucometer. Patient to test 4 times daily        Ferrous Sulfate (Tab) ferrous sulfate 325 (65 FE) MG Take 325 mg by mouth every day.        Glucose Blood (Strip) glucose blood  1 Strip by Other route 4 times a day.        Glucose Blood (Strip) glucose blood  Patient to check blood sugar 4 times a day.        Insulin Syringe-Needle U-100 (Misc) Insulin  "Syringe-Needle U-100 29G X 1/2\" 0.5 ML Patient to inject insulin at bed time        Insulin Syringe-Needle U-100 (Misc) INSULIN SYRINGE .5CC/29G 29G X 1/2\" 0.5 ML 10 Units by Does not apply route every day at 6 PM.        Lancets (Misc) Lancets  Ultra fine Lancets order: Lancets for patient to check blood sugars 4 times daily.        Norethindrone (Tab) MICRONOR 0.35 MG Take 1 Tab by mouth every day.        Prenatal Vitamins (Tab) Prenatal Vitamins (DIS) Take 1 Each by mouth every day.        .                 Medicines prescribed today were sent to:     [facility] [address] [place] [address], [place] - [address]    [address] [place] [address]    Phone: [phone] Fax: [phone]    Open 24 Hours?: No      Medication refill instructions:       If your prescription bottle indicates you have medication refills left, it is not necessary to call your provider’s office. Please contact your pharmacy and they will refill your medication.    If your prescription bottle indicates you do not have any refills left, you may request refills at any time through one of the following ways: The online [employer] system (except Urgent Care), by calling your provider’s office, or by asking your pharmacy to contact your provider’s office with a refill request. Medication refills are processed only during regular business hours and may not be available until the next business day. Your provider may request additional information or to have a follow-up visit with you prior to refilling your medication.   *Please Note: Medication refills are assigned a new Rx number when refilled electronically. Your pharmacy may indicate that no refills were authorized even though a new prescription for the same medication is available at the pharmacy. Please request the medicine by name with the pharmacy before contacting your provider for a refill.           MyChart Status: Patient Declined        "

## 2017-04-13 NOTE — MR AVS SNAPSHOT
Lexy Olivares   2017 2:30 PM   Routine Prenatal   MRN: [id number]    [facility]   Dept Phone:  [phone]    Description:  Female : 1980   Provider:  Speedy Castelan M.D.           Allergies as of 2017     No Known Allergies      You were diagnosed with     GDM, class A2   [861707]         Vital Signs     Last Menstrual Period Smoking Status                2016 (Exact Date) Never Smoker           Basic Information     Date Of Birth Sex Race Ethnicity Preferred Language    1980 Female  or   Origin (Mauritian,Danish,Indonesian,German, etc) Mauritian      Your appointments     2017  1:00 PM   Fetal Non-Stress Test with PC NST   [facility] [address] [address] [phone]              Problem List              ICD-10-CM Priority Class Noted - Resolved    Supervision of high risk pregnancy in second trimester O09.92   10/17/2016 - Present    History of GDMA2 x 3 - early 1hr GTT ordered O09.292, Z86.32   10/17/2016 - Present    GDM, class A2 O24.414   2017 - Present      Health Maintenance        Date Due Completion Dates    PAP SMEAR 10/17/2019 10/17/2016, 2014    IMM DTaP/Tdap/Td Vaccine (3 - Td) 2027, 2014            Results     POCT Fetal Nonstress Test      Component    NST Indications    GDM    NST Baseline    125    NST Uterine Activity    no    Comment:     1    NST Acoustic Stimulation    no    NST Assessment    reactive    NST Action Necessary    NST Other Data    NST Return    NST Read By    Flip                        Current Immunizations     Influenza Vaccine Quad Inj (Pf) 10/21/2014    Tdap Vaccine 2017, 2014  9:16 AM      Below and/or attached are the medications your provider expects you to take. Review all of your home medications and newly ordered medications with your provider and/or pharmacist. Follow medication  "instructions as directed by your provider and/or pharmacist. Please keep your medication list with you and share with your provider. Update the information when medications are discontinued, doses are changed, or new medications (including over-the-counter products) are added; and carry medication information at all times in the event of emergency situations     Allergies:  No Known Allergies          Medications  Valid as of: April 13, 2017 -  3:34 PM    Generic Name Brand Name Tablet Size Instructions for use    Blood Glucose Monitoring Suppl (Misc) Blood Glucose Monitoring Suppl SUPPLIES Test strips order: Test strips for FREESTYLE LITE. Please dispense for patient to check blood sugars 4 times daily.        Blood Glucose Monitoring Suppl (Misc) Blood Glucose Monitoring Suppl SUPPLIES Please dispense test strips for Accu Check Polina. Patient to check sugars 4 times daily        Blood Glucose Monitoring Suppl (Misc) Blood Glucose Monitoring Suppl SUPPLIES Please dispense lancets for Accu Check Polina. Patient to check sugars 4 times daily        Blood Glucose Monitoring Suppl (Misc) Blood Glucose Monitoring Suppl SUPPLIES Please dispense test strips for True Metrix glucometer. Patient to test 4 times daily        Ferrous Sulfate (Tab) ferrous sulfate 325 (65 FE) MG Take 325 mg by mouth every day.        Glucose Blood (Strip) glucose blood  1 Strip by Other route 4 times a day.        Glucose Blood (Strip) glucose blood  Patient to check blood sugar 4 times a day.        Insulin Syringe-Needle U-100 (Misc) Insulin Syringe-Needle U-100 29G X 1/2\" 0.5 ML Patient to inject insulin at bed time        Insulin Syringe-Needle U-100 (Misc) INSULIN SYRINGE .5CC/29G 29G X 1/2\" 0.5 ML 10 Units by Does not apply route every day at 6 PM.        Lancets (Misc) Lancets  Ultra fine Lancets order: Lancets for patient to check blood sugars 4 times daily.        Norethindrone (Tab) MICRONOR 0.35 MG Take 1 Tab by mouth every day.       "    Prenatal Vitamins (Tab) Prenatal Vitamins (DIS) Take 1 Each by mouth every day.        .                 Medicines prescribed today were sent to:     [facility] - [place], [place] - [address] [place] [address]    Phone: [phone] Fax: [phone]    Open 24 Hours?: No      Medication refill instructions:       If your prescription bottle indicates you have medication refills left, it is not necessary to call your provider’s office. Please contact your pharmacy and they will refill your medication.    If your prescription bottle indicates you do not have any refills left, you may request refills at any time through one of the following ways: The online [employer] system (except Urgent Care), by calling your provider’s office, or by asking your pharmacy to contact your provider’s office with a refill request. Medication refills are processed only during regular business hours and may not be available until the next business day. Your provider may request additional information or to have a follow-up visit with you prior to refilling your medication.   *Please Note: Medication refills are assigned a new Rx number when refilled electronically. Your pharmacy may indicate that no refills were authorized even though a new prescription for the same medication is available at the pharmacy. Please request the medicine by name with the pharmacy before contacting your provider for a refill.           MyChart Status: Patient Declined

## 2017-04-13 NOTE — PROGRESS NOTES
37 y.o.   38w2d with diagnosis of GDM: gestational. obesity    Subjective: Fetal movement: positive, Vaginal Bleeding:negative, Loss of Fluid: negative, Following diet :positive, Insulin Use:positive. Blood sugar logs reviewed and are posted in diabetic flowsheet.   Patient Active Problem List    Diagnosis Date Noted   • GDM, class A2 2017   • Supervision of high risk pregnancy in second trimester 10/17/2016   • History of GDMA2 x 3 - early 1hr GTT ordered 10/17/2016       Current Treatment:     AM     insulin injections: NPH: 7    PM:    insulin injections: regular: 4    QHS   insulin injections: NPH: 18    FBS Range: 85-95  Postprandial Range: < 130     Filed Vitals:    17 1450   BP: 111/65   Weight: 117.028 kg (258 lb)       NST:    NST reactive      Bedside Scan : increased SLICK, Breech , incomplete , Anterior Right lateral placenta   Ass:     38w2d  GDM: Class _A-2____  Good control positive  Breech , with polyhydramnios   Not candidate for ECV    P. New(Yes)  Continue Same ( No )Diabetic treatment Plan  AM: insulin injections: NPH: 7  PM insulin injections: regular: 4  QHS:insulin injections: NPH: 20        Convert IOL to Primary C/S     NST 2x /week after 32 weeks  Frequent assessment of  Fetal weight  IOL /C/S delivery at 38-39 weeks

## 2017-04-14 ENCOUNTER — TELEPHONE (OUTPATIENT)
Dept: OBGYN | Facility: CLINIC | Age: 37
End: 2017-04-14

## 2017-04-14 NOTE — TELEPHONE ENCOUNTER
Pt returned call, was notified of c/s date and time, was advised to come for NST on 4/17/17.   Verbalized understanding.

## 2017-04-14 NOTE — PROGRESS NOTES
Patient is scheduled for C/S on 04/18/17 at 9:30am with Dr Joel Seth.  Per Dr Maldonado. Dorinda, have patient arrive at 7:30am and she will pre op her day of C/S    Please notify and give instructions next visit. Thanks.

## 2017-04-14 NOTE — TELEPHONE ENCOUNTER
Patient is scheduled for C/S on 04/18/17 at 9:30am with Dr Joel Seth.  Per Dr Maldonado. Dorinda, have patient arrive at 7:30am and she will pre op her day of C/S    Unable to contact pt msg left to call back FYI placed.

## 2017-04-17 ENCOUNTER — ROUTINE PRENATAL (OUTPATIENT)
Dept: OBGYN | Facility: CLINIC | Age: 37
End: 2017-04-17

## 2017-04-17 DIAGNOSIS — O24.419 GDM, CLASS A2: ICD-10-CM

## 2017-04-17 LAB
NST ACOUSTIC STIMULATION: NO
NST ACTION NECESSARY: NORMAL
NST ASSESSMENT: NORMAL
NST BASELINE: 150
NST INDICATIONS: NORMAL
NST OTHER DATA: NORMAL
NST READ BY: NORMAL
NST RETURN: NORMAL
NST UTERINE ACTIVITY: NORMAL

## 2017-04-17 PROCEDURE — 59025 FETAL NON-STRESS TEST: CPT | Performed by: OBSTETRICS & GYNECOLOGY

## 2017-04-17 NOTE — MR AVS SNAPSHOT
Lexy Olivares   2017 1:00 PM   Routine Prenatal   MRN: [id number]    [facility]   Dept Phone:  [phone]    Description:  Female : 1980   Provider:  Alisson Márquez M.D.           Allergies as of 2017     No Known Allergies      You were diagnosed with     GDM, class A2   [785509]         Vital Signs     Last Menstrual Period Smoking Status                2016 (Exact Date) Never Smoker           Basic Information     Date Of Birth Sex Race Ethnicity Preferred Language    1980 Female  or   Origin (Moldovan,Mongolian,Libyan,South Sudanese, etc) Moldovan      Your appointments     2017  2:00 PM   Fetal Non-Stress Test with PC NST   [facility])    [address] [place] [address] [address]  [place] [address] [phone]            2017  2:30 PM   Diabetic with PC MD   [facility] [address]  [place] [address] [phone]              Problem List              ICD-10-CM Priority Class Noted - Resolved    Supervision of high risk pregnancy in second trimester O09.92   10/17/2016 - Present    History of GDMA2 x 3 - early 1hr GTT ordered O09.292, Z86.32   10/17/2016 - Present    GDM, class A2 O24.414   2017 - Present      Health Maintenance        Date Due Completion Dates    PAP SMEAR 10/17/2019 10/17/2016, 2014    IMM DTaP/Tdap/Td Vaccine (3 - Td) 2027, 2014            Results       Current Immunizations     Influenza Vaccine Quad Inj (Pf) 10/21/2014    Tdap Vaccine 2017, 2014  9:16 AM      Below and/or attached are the medications your provider expects you to take. Review all of your home medications and newly ordered medications with your provider and/or pharmacist. Follow medication instructions as directed by your provider and/or pharmacist. Please keep your medication list with you and share with your provider. Update the information when  "medications are discontinued, doses are changed, or new medications (including over-the-counter products) are added; and carry medication information at all times in the event of emergency situations     Allergies:  No Known Allergies          Medications  Valid as of: April 17, 2017 -  1:58 PM    Generic Name Brand Name Tablet Size Instructions for use    Blood Glucose Monitoring Suppl (Misc) Blood Glucose Monitoring Suppl SUPPLIES Test strips order: Test strips for FREESTYLE LITE. Please dispense for patient to check blood sugars 4 times daily.        Blood Glucose Monitoring Suppl (Misc) Blood Glucose Monitoring Suppl SUPPLIES Please dispense test strips for Accu Check Polina. Patient to check sugars 4 times daily        Blood Glucose Monitoring Suppl (Misc) Blood Glucose Monitoring Suppl SUPPLIES Please dispense lancets for Accu Check Polina. Patient to check sugars 4 times daily        Blood Glucose Monitoring Suppl (Misc) Blood Glucose Monitoring Suppl SUPPLIES Please dispense test strips for True Metrix glucometer. Patient to test 4 times daily        Ferrous Sulfate (Tab) ferrous sulfate 325 (65 FE) MG Take 325 mg by mouth every day.        Glucose Blood (Strip) glucose blood  1 Strip by Other route 4 times a day.        Glucose Blood (Strip) glucose blood  Patient to check blood sugar 4 times a day.        Insulin Syringe-Needle U-100 (Misc) Insulin Syringe-Needle U-100 29G X 1/2\" 0.5 ML Patient to inject insulin at bed time        Insulin Syringe-Needle U-100 (Misc) INSULIN SYRINGE .5CC/29G 29G X 1/2\" 0.5 ML 10 Units by Does not apply route every day at 6 PM.        Lancets (Misc) Lancets  Ultra fine Lancets order: Lancets for patient to check blood sugars 4 times daily.        Norethindrone (Tab) MICRONOR 0.35 MG Take 1 Tab by mouth every day.        Prenatal Vitamins (Tab) Prenatal Vitamins (DIS) Take 1 Each by mouth every day.        .                 Medicines prescribed today were sent to:     [facility] [address] [place] [address], [place] - [address]    [address] [place] [address]    Phone: [phone] Fax: [phone]    Open 24 Hours?: No      Medication refill instructions:       If your prescription bottle indicates you have medication refills left, it is not necessary to call your provider’s office. Please contact your pharmacy and they will refill your medication.    If your prescription bottle indicates you do not have any refills left, you may request refills at any time through one of the following ways: The online Vaddio system (except Urgent Care), by calling your provider’s office, or by asking your pharmacy to contact your provider’s office with a refill request. Medication refills are processed only during regular business hours and may not be available until the next business day. Your provider may request additional information or to have a follow-up visit with you prior to refilling your medication.   *Please Note: Medication refills are assigned a new Rx number when refilled electronically. Your pharmacy may indicate that no refills were authorized even though a new prescription for the same medication is available at the pharmacy. Please request the medicine by name with the pharmacy before contacting your provider for a refill.           [employer] Status: Patient Declined

## 2017-04-18 ENCOUNTER — HOSPITAL ENCOUNTER (INPATIENT)
Facility: MEDICAL CENTER | Age: 37
LOS: 3 days | End: 2017-04-21
Attending: OBSTETRICS & GYNECOLOGY | Admitting: OBSTETRICS & GYNECOLOGY
Payer: MEDICAID

## 2017-04-18 LAB
BASOPHILS # BLD AUTO: 0.3 % (ref 0–1.8)
BASOPHILS # BLD: 0.02 K/UL (ref 0–0.12)
EOSINOPHIL # BLD AUTO: 0.05 K/UL (ref 0–0.51)
EOSINOPHIL NFR BLD: 0.7 % (ref 0–6.9)
ERYTHROCYTE [DISTWIDTH] IN BLOOD BY AUTOMATED COUNT: 44.8 FL (ref 35.9–50)
GLUCOSE BLD-MCNC: 92 MG/DL (ref 65–99)
HCT VFR BLD AUTO: 39.4 % (ref 37–47)
HGB BLD-MCNC: 13.3 G/DL (ref 12–16)
HOLDING TUBE BB 8507: NORMAL
IMM GRANULOCYTES # BLD AUTO: 0.03 K/UL (ref 0–0.11)
IMM GRANULOCYTES NFR BLD AUTO: 0.4 % (ref 0–0.9)
LYMPHOCYTES # BLD AUTO: 1.75 K/UL (ref 1–4.8)
LYMPHOCYTES NFR BLD: 23.6 % (ref 22–41)
MCH RBC QN AUTO: 30.2 PG (ref 27–33)
MCHC RBC AUTO-ENTMCNC: 33.8 G/DL (ref 33.6–35)
MCV RBC AUTO: 89.5 FL (ref 81.4–97.8)
MONOCYTES # BLD AUTO: 0.35 K/UL (ref 0–0.85)
MONOCYTES NFR BLD AUTO: 4.7 % (ref 0–13.4)
NEUTROPHILS # BLD AUTO: 5.22 K/UL (ref 2–7.15)
NEUTROPHILS NFR BLD: 70.3 % (ref 44–72)
NRBC # BLD AUTO: 0 K/UL
NRBC BLD AUTO-RTO: 0 /100 WBC
PLATELET # BLD AUTO: 171 K/UL (ref 164–446)
PMV BLD AUTO: 10.7 FL (ref 9–12.9)
RBC # BLD AUTO: 4.4 M/UL (ref 4.2–5.4)
WBC # BLD AUTO: 7.4 K/UL (ref 4.8–10.8)

## 2017-04-18 PROCEDURE — 700102 HCHG RX REV CODE 250 W/ 637 OVERRIDE(OP): Performed by: ANESTHESIOLOGY

## 2017-04-18 PROCEDURE — 700111 HCHG RX REV CODE 636 W/ 250 OVERRIDE (IP): Performed by: ANESTHESIOLOGY

## 2017-04-18 PROCEDURE — 59514 CESAREAN DELIVERY ONLY: CPT

## 2017-04-18 PROCEDURE — 304964 HCHG RECOVERY ROOM TIME 1HR

## 2017-04-18 PROCEDURE — 700112 HCHG RX REV CODE 229: Performed by: OBSTETRICS & GYNECOLOGY

## 2017-04-18 PROCEDURE — 700111 HCHG RX REV CODE 636 W/ 250 OVERRIDE (IP)

## 2017-04-18 PROCEDURE — 85025 COMPLETE CBC W/AUTO DIFF WBC: CPT

## 2017-04-18 PROCEDURE — 305385 HCHG SURGICAL SERVICES 1/4 HOUR

## 2017-04-18 PROCEDURE — 306828 HCHG ANES-TIME GENERAL: Performed by: OBSTETRICS & GYNECOLOGY

## 2017-04-18 PROCEDURE — 770002 HCHG ROOM/CARE - OB PRIVATE (112)

## 2017-04-18 PROCEDURE — 82962 GLUCOSE BLOOD TEST: CPT

## 2017-04-18 PROCEDURE — 700101 HCHG RX REV CODE 250

## 2017-04-18 PROCEDURE — 700111 HCHG RX REV CODE 636 W/ 250 OVERRIDE (IP): Performed by: FAMILY MEDICINE

## 2017-04-18 PROCEDURE — A9270 NON-COVERED ITEM OR SERVICE: HCPCS | Performed by: ANESTHESIOLOGY

## 2017-04-18 PROCEDURE — A9270 NON-COVERED ITEM OR SERVICE: HCPCS | Performed by: OBSTETRICS & GYNECOLOGY

## 2017-04-18 PROCEDURE — 36415 COLL VENOUS BLD VENIPUNCTURE: CPT

## 2017-04-18 RX ORDER — OXYCODONE AND ACETAMINOPHEN 10; 325 MG/1; MG/1
1 TABLET ORAL EVERY 4 HOURS PRN
Status: DISCONTINUED | OUTPATIENT
Start: 2017-04-18 | End: 2017-04-19

## 2017-04-18 RX ORDER — HALOPERIDOL 5 MG/ML
1 INJECTION INTRAMUSCULAR ONCE
Status: COMPLETED | OUTPATIENT
Start: 2017-04-18 | End: 2017-04-18

## 2017-04-18 RX ORDER — SODIUM CHLORIDE, SODIUM LACTATE, POTASSIUM CHLORIDE, CALCIUM CHLORIDE 600; 310; 30; 20 MG/100ML; MG/100ML; MG/100ML; MG/100ML
INJECTION, SOLUTION INTRAVENOUS PRN
Status: DISCONTINUED | OUTPATIENT
Start: 2017-04-18 | End: 2017-04-21 | Stop reason: HOSPADM

## 2017-04-18 RX ORDER — METOCLOPRAMIDE HYDROCHLORIDE 5 MG/ML
10 INJECTION INTRAMUSCULAR; INTRAVENOUS ONCE
Status: COMPLETED | OUTPATIENT
Start: 2017-04-18 | End: 2017-04-18

## 2017-04-18 RX ORDER — OXYCODONE HYDROCHLORIDE AND ACETAMINOPHEN 5; 325 MG/1; MG/1
1 TABLET ORAL EVERY 4 HOURS PRN
Status: DISCONTINUED | OUTPATIENT
Start: 2017-04-18 | End: 2017-04-19

## 2017-04-18 RX ORDER — DOCUSATE SODIUM 100 MG/1
100 CAPSULE, LIQUID FILLED ORAL 2 TIMES DAILY PRN
Status: DISCONTINUED | OUTPATIENT
Start: 2017-04-18 | End: 2017-04-21 | Stop reason: HOSPADM

## 2017-04-18 RX ORDER — ONDANSETRON 4 MG/1
4 TABLET, ORALLY DISINTEGRATING ORAL EVERY 6 HOURS PRN
Status: DISCONTINUED | OUTPATIENT
Start: 2017-04-18 | End: 2017-04-21 | Stop reason: HOSPADM

## 2017-04-18 RX ORDER — METOCLOPRAMIDE HYDROCHLORIDE 5 MG/ML
10 INJECTION INTRAMUSCULAR; INTRAVENOUS EVERY 6 HOURS PRN
Status: DISCONTINUED | OUTPATIENT
Start: 2017-04-18 | End: 2017-04-21 | Stop reason: HOSPADM

## 2017-04-18 RX ORDER — SIMETHICONE 80 MG
80 TABLET,CHEWABLE ORAL 4 TIMES DAILY PRN
Status: DISCONTINUED | OUTPATIENT
Start: 2017-04-18 | End: 2017-04-21 | Stop reason: HOSPADM

## 2017-04-18 RX ORDER — SODIUM CHLORIDE, SODIUM LACTATE, POTASSIUM CHLORIDE, CALCIUM CHLORIDE 600; 310; 30; 20 MG/100ML; MG/100ML; MG/100ML; MG/100ML
INJECTION, SOLUTION INTRAVENOUS CONTINUOUS
Status: DISCONTINUED | OUTPATIENT
Start: 2017-04-18 | End: 2017-04-18 | Stop reason: HOSPADM

## 2017-04-18 RX ORDER — METHYLERGONOVINE MALEATE 0.2 MG/ML
0.2 INJECTION INTRAVENOUS
Status: DISCONTINUED | OUTPATIENT
Start: 2017-04-18 | End: 2017-04-18 | Stop reason: HOSPADM

## 2017-04-18 RX ORDER — ONDANSETRON 2 MG/ML
4 INJECTION INTRAMUSCULAR; INTRAVENOUS EVERY 6 HOURS PRN
Status: DISCONTINUED | OUTPATIENT
Start: 2017-04-18 | End: 2017-04-21 | Stop reason: HOSPADM

## 2017-04-18 RX ORDER — VITAMIN A ACETATE, BETA CAROTENE, ASCORBIC ACID, CHOLECALCIFEROL, .ALPHA.-TOCOPHEROL ACETATE, DL-, THIAMINE MONONITRATE, RIBOFLAVIN, NIACINAMIDE, PYRIDOXINE HYDROCHLORIDE, FOLIC ACID, CYANOCOBALAMIN, CALCIUM CARBONATE, FERROUS FUMARATE, ZINC OXIDE, CUPRIC OXIDE 3080; 12; 120; 400; 1; 1.84; 3; 20; 22; 920; 25; 200; 27; 10; 2 [IU]/1; UG/1; MG/1; [IU]/1; MG/1; MG/1; MG/1; MG/1; MG/1; [IU]/1; MG/1; MG/1; MG/1; MG/1; MG/1
1 TABLET, FILM COATED ORAL EVERY MORNING
Status: DISCONTINUED | OUTPATIENT
Start: 2017-04-18 | End: 2017-04-21 | Stop reason: HOSPADM

## 2017-04-18 RX ORDER — CARBOPROST TROMETHAMINE 250 UG/ML
250 INJECTION, SOLUTION INTRAMUSCULAR
Status: DISCONTINUED | OUTPATIENT
Start: 2017-04-18 | End: 2017-04-18 | Stop reason: HOSPADM

## 2017-04-18 RX ORDER — CEFAZOLIN SODIUM 1 G/3ML
1 INJECTION, POWDER, FOR SOLUTION INTRAMUSCULAR; INTRAVENOUS ONCE
Status: DISCONTINUED | OUTPATIENT
Start: 2017-04-18 | End: 2017-04-18 | Stop reason: HOSPADM

## 2017-04-18 RX ORDER — MISOPROSTOL 200 UG/1
800 TABLET ORAL
Status: DISCONTINUED | OUTPATIENT
Start: 2017-04-18 | End: 2017-04-18 | Stop reason: HOSPADM

## 2017-04-18 RX ORDER — SODIUM CHLORIDE, SODIUM LACTATE, POTASSIUM CHLORIDE, CALCIUM CHLORIDE 600; 310; 30; 20 MG/100ML; MG/100ML; MG/100ML; MG/100ML
1500 INJECTION, SOLUTION INTRAVENOUS ONCE
Status: COMPLETED | OUTPATIENT
Start: 2017-04-18 | End: 2017-04-18

## 2017-04-18 RX ORDER — DIPHENHYDRAMINE HYDROCHLORIDE 50 MG/ML
12.5 INJECTION INTRAMUSCULAR; INTRAVENOUS EVERY 6 HOURS PRN
Status: DISCONTINUED | OUTPATIENT
Start: 2017-04-18 | End: 2017-04-21 | Stop reason: HOSPADM

## 2017-04-18 RX ORDER — HALOPERIDOL 5 MG/ML
INJECTION INTRAMUSCULAR
Status: COMPLETED
Start: 2017-04-18 | End: 2017-04-18

## 2017-04-18 RX ORDER — KETOROLAC TROMETHAMINE 30 MG/ML
30 INJECTION, SOLUTION INTRAMUSCULAR; INTRAVENOUS EVERY 6 HOURS
Status: DISPENSED | OUTPATIENT
Start: 2017-04-18 | End: 2017-04-19

## 2017-04-18 RX ADMIN — SODIUM CITRATE AND CITRIC ACID MONOHYDRATE 30 ML: 500; 334 SOLUTION ORAL at 09:20

## 2017-04-18 RX ADMIN — METOCLOPRAMIDE 10 MG: 5 INJECTION, SOLUTION INTRAMUSCULAR; INTRAVENOUS at 16:31

## 2017-04-18 RX ADMIN — METOCLOPRAMIDE 10 MG: 5 INJECTION, SOLUTION INTRAMUSCULAR; INTRAVENOUS at 09:20

## 2017-04-18 RX ADMIN — SODIUM CHLORIDE, POTASSIUM CHLORIDE, SODIUM LACTATE AND CALCIUM CHLORIDE: 600; 310; 30; 20 INJECTION, SOLUTION INTRAVENOUS at 09:23

## 2017-04-18 RX ADMIN — KETOROLAC TROMETHAMINE 30 MG: 30 INJECTION, SOLUTION INTRAMUSCULAR at 23:41

## 2017-04-18 RX ADMIN — FAMOTIDINE 20 MG: 10 INJECTION, SOLUTION INTRAVENOUS at 09:20

## 2017-04-18 RX ADMIN — HALOPERIDOL LACTATE 1 MG: 5 INJECTION, SOLUTION INTRAMUSCULAR at 14:25

## 2017-04-18 RX ADMIN — HALOPERIDOL 1 MG: 5 INJECTION INTRAMUSCULAR at 13:08

## 2017-04-18 RX ADMIN — HALOPERIDOL LACTATE 1 MG: 5 INJECTION, SOLUTION INTRAMUSCULAR at 13:08

## 2017-04-18 RX ADMIN — Medication 125 ML/HR: at 13:06

## 2017-04-18 RX ADMIN — KETOROLAC TROMETHAMINE 30 MG: 30 INJECTION, SOLUTION INTRAMUSCULAR at 18:16

## 2017-04-18 RX ADMIN — OXYCODONE HYDROCHLORIDE AND ACETAMINOPHEN 1 TABLET: 5; 325 TABLET ORAL at 22:08

## 2017-04-18 RX ADMIN — OXYCODONE HYDROCHLORIDE AND ACETAMINOPHEN 1 TABLET: 10; 325 TABLET ORAL at 17:11

## 2017-04-18 RX ADMIN — DOCUSATE SODIUM 100 MG: 100 CAPSULE ORAL at 23:41

## 2017-04-18 RX ADMIN — SODIUM CHLORIDE, POTASSIUM CHLORIDE, SODIUM LACTATE AND CALCIUM CHLORIDE 1500 ML: 600; 310; 30; 20 INJECTION, SOLUTION INTRAVENOUS at 08:30

## 2017-04-18 ASSESSMENT — PAIN SCALES - GENERAL
PAINLEVEL_OUTOF10: 3
PAINLEVEL_OUTOF10: 0
PAINLEVEL_OUTOF10: 4
PAINLEVEL_OUTOF10: 0
PAINLEVEL_OUTOF10: 0
PAINLEVEL_OUTOF10: 7
PAINLEVEL_OUTOF10: 0
PAINLEVEL_OUTOF10: 2

## 2017-04-18 ASSESSMENT — LIFESTYLE VARIABLES
DO YOU DRINK ALCOHOL: NO
ALCOHOL_USE: NO
EVER_SMOKED: NEVER

## 2017-04-18 NOTE — IP AVS SNAPSHOT
Home Care Instructions                                                                                                                Lexy Olivares   MRN: [id number]    Department:  POST PARTUM 31   2017           Your appointments     2017 11:30 AM   Post Partum  Check with VINH EMERSON   [facility] ([facility])    [address] [facility] [address]  [place] [address] [phone]              Follow-up Information     1. Follow up with [facility], M.D. In 1 week.    Specialty:  OB/Gyn    Why:  For wound re-check    Contact information    [address] [facility]  [address]  [place] [address]  [phone]         I assume responsibility for securing a follow-up Pixley Screening blood test on my baby within the specified date range.    -                  Discharge Instructions       POSTPARTUM DISCHARGE INSTRUCTIONS FOR MOM    YOB: 1980   Age: 37 y.o.               Admit Date: 2017     Discharge Date: 2017  Attending Doctor:  Dorinda Hughes*                  Allergies:  Review of patient's allergies indicates no known allergies.    Discharged to home by car. Discharged via wheelchair, hospital escort: Yes.  Special equipment needed: Not Applicable  Belongings with: Personal  Be sure to schedule a follow-up appointment with your primary care doctor or any specialists as instructed.     Discharge Plan:   Diet Plan: Discussed  Activity Level: Discussed  Confirmed Follow up Appointment: Appointment Scheduled  Confirmed Symptoms Management: Discussed  Medication Reconciliation Updated: Yes  Influenza Vaccine Indication: Patient Refuses    REASONS TO CALL YOUR OBSTETRICIAN:  1.   Persistent fever or shaking chills (Temperature higher than 100.4)  2.   Heavy bleeding (soaking more than 1 pad per hour); Passing clots  3.   Foul odor from vagina  4.   Mastitis (Breast infection; breast pain, chills, fever, redness)  5.   Urinary pain, burning or frequency  6.   Episiotomy  "infection  7.   Abdominal incision infection  8.   Severe depression longer than 24 hours    HAND WASHING  · Prior to handling the baby.  · Before breastfeeding or bottle feeding baby.  · After using the bathroom or changing the baby's diaper.    WOUND CARE  Ask your physician for additional care instructions.  In general:    ·  Incision:      · Keep clean and dry.    · Do NOT lift anything heavier than your baby for up to 6 weeks.    · There should not be any opening or pus.      VAGINAL CARE  · Nothing inside vagina for 6 weeks: no sexual intercourse, tampons or douching.  · Bleeding may continue for 2-4 weeks.  Amount may vary.    · Call your physician for heavy bleeding which means soaking more than 1 pad per hour    BIRTH CONTROL  · It is possible to become pregnant at any time after delivery and while breastfeeding.  · Plan to discuss a method of birth control with your physician at your follow up visit. visit.    DIET AND ELIMINATION  · Eating more fiber (bran cereal, fruits, and vegetables) and drinking plenty of fluids will help to avoid constipation.  · Urinary frequency after childbirth is normal.    POSTPARTUM BLUES  During the first few days after birth, you may experience a sense of the \"blues\" which may include impatience, irritability or even crying.  These feeling come and go quickly.  However, as many as 1 in 10 women experience emotional symptoms known as postpartum depression.    Postpartum depression:  May start as early as the second or third day after delivery or take several weeks or months to develop.  Symptoms of \"blues\" are present, but are more intense:  Crying spells; loss of appetite; feelings of hopelessness or loss of control; fear of touching the baby; over concern or no concern at all about the baby; little or no concern about your own appearance/caring for yourself; and/or inability to sleep or excessive sleeping.  Contact your physician if you are experiencing any of " "these symptoms.    Crisis Hotline:  · [place] Crisis Hotline:  5-039-IZJGZRF  Or 1-131.435.7556  · [place] Crisis Hotline:  1-275.328.6236  Or 967-928-9945    PREVENTING SHAKEN BABY:  If you are angry or stressed, PUT THE BABY IN THE CRIB, step away, take some deep breaths, and wait until you are calm to care for the baby.  DO NOT SHAKE THE BABY.  You are not alone, call a supporter for help.    · Crisis Call Center 24/7 crisis line 920-953-6375 or 1-150.764.3167  · You can also text them, text \"ANSWER\" to 072662    QUIT SMOKING/TOBACCO USE:  I understand the use of any tobacco products increases my chance of suffering from future heart disease and could cause other illnesses which may shorten my life. Quitting the use of tobacco products is the single most important thing I can do to improve my health. For further information on smoking / tobacco cessation call a Toll Free Quit Line at 1-355.700.6393 (*National Cancer [place]) or 1-152.280.3907 (American Lung Association) or you can access the web based program at www.lungusa.org.    · [place] Tobacco Users Help Line:  (308) 897-6568       Toll Free: 1-614.228.5029  · Quit Tobacco Program [facility] (651)975-6831    DEPRESSION / SUICIDE RISK:  As you are discharged from this [facility], it is important to learn how to keep safe from harming yourself.    Recognize the warning signs:  · Abrupt changes in personality, positive or negative- including increase in energy   · Giving away possessions  · Change in eating patterns- significant weight changes-  positive or negative  · Change in sleeping patterns- unable to sleep or sleeping all the time   · Unwillingness or inability to communicate  · Depression  · Unusual sadness, discouragement and loneliness  · Talk of wanting to die  · Neglect of personal appearance   · Rebelliousness- reckless behavior  · Withdrawal from people/activities they love  · Confusion- inability to " concentrate     If you or a loved one observes any of these behaviors or has concerns about self-harm, here's what you can do:  · Talk about it- your feelings and reasons for harming yourself  · Remove any means that you might use to hurt yourself (examples: pills, rope, extension cords, firearm)  · Get professional help from the community (Mental Health, Substance Abuse, psychological counseling)  · Do not be alone:Call your Safe Contact- someone whom you trust who will be there for you.  · Call your local CRISIS HOTLINE 139-6368 or 262-968-9919  · Call your local Children's Mobile Crisis Response Team [place] (482) 827-8526 or www.Yesmywine  · Call the toll free National Suicide Prevention Hotlines   · National Suicide Prevention Lifeline 814-936-EJFU (2471)  · National Hope Line Network 800-SUICIDE (874-8215)    DISCHARGE SURVEY:  Thank you for choosing [facility].  We hope we provided you with very good care.  You may be receiving a survey in the mail.  Please fill it out.  Your opinion is valuable to us.    ADDITIONAL EDUCATIONAL MATERIALS GIVEN TO PATIENT:        My signature on this form indicates that:  1.  I have reviewed and understand the above information  2.  My questions regarding this information have been answered to my satisfaction.  3.  I have formulated a plan with my discharge nurse to obtain my prescribed medication for home.         Discharge Medication Instructions:    Below are the medications your physician expects you to take upon discharge:    Review all your home medications and newly ordered medications with your doctor and/or pharmacist. Follow medication instructions as directed by your doctor and/or pharmacist.    Please keep your medication list with you and share with your physician.               Medication List      START taking these medications        Instructions     MG Caps   Last time this was given:  100 mg on 4/20/2017  7:55 AM    Take 100 mg by mouth  "2 times a day as needed for Constipation.   Dose:  100 mg       ibuprofen 600 MG Tabs   Last time this was given:  600 mg on 4/21/2017  5:25 AM   Commonly known as:  MOTRIN    Take 1 Tab by mouth every 6 hours as needed (For cramping after delivery; do not give if patient is receiving ketorolac (Toradol)).   Dose:  600 mg       oxycodone-acetaminophen 5-325 MG Tabs   Last time this was given:  1 Tab on 4/21/2017  5:25 AM   Commonly known as:  PERCOCET    Take 1 Tab by mouth every four hours as needed (for Moderate Pain (Pain Scale 4-6) after delivery).   Dose:  1 Tab         CONTINUE taking these medications        Instructions    ferrous sulfate 325 (65 FE) MG tablet    Take 325 mg by mouth every day.   Dose:  325 mg       Prenatal Vitamins (DIS) Tabs    Take 1 Each by mouth every day.   Dose:  1 Each         STOP taking these medications     Blood Glucose Monitoring Suppl SUPPLIES Misc       glucose blood strip   Commonly known as:  TRUE METRIX BLOOD GLUCOSE TEST       INSULIN SYRINGE .5CC/29G 29G X 1/2\" 0.5 ML Misc   Commonly known as:  B-D INS SYR ULTRAFINE .5CC/29G       Insulin Syringe-Needle U-100 29G X 1/2\" 0.5 ML Misc       Lancets Misc       norethindrone 0.35 MG tablet   Commonly known as:  MICRONOR               Crisis Hotline:     National Crisis Hotline:  8-337-JZHESPY or 1-715.735.8245    [place] Crisis Hotline:    1-975.736.1582 or 341-087-3587        Disclaimer           _____________________________________                     __________       ________       Patient/Mother Signature or Legal                          Date                   Time               "

## 2017-04-18 NOTE — OP REPORT
DATE OF PROCEDURE:  2017    POST-OP DIAGNOSIS: Term 39+ weeks, A2GDM                                          Unstable Lie, oblique                                          Macrosomia    PROCEDURE: PRIMARY LOW TRANSVERSE  SECTION VIA PFANNENSTIEL    SURGEON: SUKI EMERSON     ASSISTANT: DENNIS EMERSON    ANESTHESIOLOGIST: OPAL EMERSON    TYPE OF ANESTHESIA: SPINAL    SPECIMEN: NONE    EBL: 700 CC      UO: CLEAR    FINDINGS:       delivered a live born baby girl, oblique lie BW 4855 gm (10 lb 11 oz) AS 8/9                  Clear AF                  Placenta complete and intact 3 VC                  Uterus bilateral tubes and ovaries grossly normal     DESCRIPTION OF PROCEDURE:      Patient and family discussed about the risks, indications, alternatives and complications of the procedure. No further questions. Signed consents.   Patient is taken to the operating room where spinal anesthesia induction was done without difficulty. She is then placed in a supine position with a leftward tilt, prepped and draped in a normal usual sterile fashion. After adequate level of anesthesia has been ascertained, Pfannenstiel incision is then made on the skin and carried down to the underlying layer of fascia. Fascia is nicked in the midline and incision carried down bilaterally sharply. Superior and inferior aspects of fascial incision is clamped with Kocher's, elevated, tented up and dissected off bluntly from underlying rectus muscle. Rectus muscle is then  in the midline and peritoneum entered sharply and extended cephalo-caudally. Good visualization of the lower uterine segment afforded. Bladder blade is inserted with identification of the vesico-uterine peritoneum and creation of bladder flap to put down the bladder.   Low transverse incision is then made at the lower uterine segment and extended bilaterally digitally. Amniotomy is clear. Vertex guided into the incision and infant is delivered in vertex  presentation, good suctioning of the nose and mouth is done and rest of the body delivered atraumatically. Cord clamped and cut and handed off to the RN in attendance for further care.   Placenta delivered complete and intact, 3 VC.  Uterus is exteriorized. The cavity is cleansed of all clots and debris.   Hysterotomy incision is repaired in 2 layers - using Chromic 1 - 1st layer in a continuous interlocking fashion, and second layer in imbricating sutures, with excellent hemostasis.   Uterus is returned into the abdomen. Pelvic gutters cleared of clots and debris.   Peritoneal layer closed in simple continuous manner using Vicryl 2-0.  Rectus muscle reapproximated in the midline using chromic 1.  Fascial layer repaired using Vicryl 0 in the simple continuous fashion.  Subcutaneous layer reapproximated using Vicryl 3-0.  Skin closed with Insorb staples.   All layers are hemostatic.  Procedure is terminated. Sponges, Laps and needles count correct x 3.  Patient is taken to the PACU, awake and in good condition.

## 2017-04-18 NOTE — OR SURGEON
Immediate Post-Operative Note      PreOp Diagnosis: Term 39+ weeks, Transverse Lie, Unstable Lie                                A2GDM    PostOp Diagnosis: same, unstable lie, oblique lie                                  Macrosomia  Procedure(s):  PRIMARY LOW TRANSVERSE  SECTION VIA PFANNENSTIEL- Wound Class: Clean Contaminated    Surgeon(s):  Dorinda Pelletier M.D.    Anesthesiologist/Type of Anesthesia:  Anesthesiologist: Rik Mc M.D./Spinal    Surgical Staff:  Circulator: Rosie iSnger R.N.  Scrub Person: Giselle Pinto  L&DUNCAN Baby  Nurse: Precious Da Silva R.N.    Specimen: none    Estimated Blood Loss: 700 cc    Findings: delivered a live born baby girl, oblique lie BW 4855 gm (10 lb 11 oz) AS 8/9                  Clear AF                  Placenta complete and intact 3 VC                  Uterus bilateral tubes and ovaries grossly normal     Complications: none    2017 1:11 PM Dorinda Seth MD

## 2017-04-18 NOTE — IP AVS SNAPSHOT
4/21/2017    Lexy Olivares  [address] [place] [address]    Dear Lexy:    [facility] wants to ensure your discharge home is safe and you or your loved ones have had all of your questions answered regarding your care after you leave the hospital.    Below is a list of resources and contact information should you have any questions regarding your hospital stay, follow-up instructions, or active medical symptoms.    Questions or Concerns Regarding… Contact   Medical Questions Related to Your Discharge  (7 days a week, 8am-5pm) Contact a Nurse Care Coordinator   [phone]   Medical Questions Not Related to Your Discharge  (24 hours a day / 7 days a week)  Contact the Nurse Health Line   [phone]    Medications or Discharge Instructions Refer to your discharge packet   or contact your [facility] Primary Care Provider   [phone]   Follow-up Appointment(s) Schedule your appointment via Webcom   or contact Scheduling [phone]   Billing Review your statement via Webcom  or contact Billing [phone]   Medical Records Review your records via Webcom   or contact Medical Records [phone]     You may receive a telephone call within two days of discharge. This call is to make certain you understand your discharge instructions and have the opportunity to have any questions answered. You can also easily access your medical information, test results and upcoming appointments via the Webcom free online health management tool. You can learn more and sign up at [employer]/Webcom. For assistance setting up your Webcom account, please call [phone].    Once again, we want to ensure your discharge home is safe and that you have a clear understanding of any next steps in your care. If you have any questions or concerns, please do not hesitate to contact us, we are here for you. Thank you for choosing [facility] for your healthcare needs.    Sincerely,    Your [facility]  Healthcare Team

## 2017-04-18 NOTE — PROGRESS NOTES
TRANSFERRED FROM L&D TO POSTPARTUM VIA GUERNEY. TRANSFERRED TO BED. ASSESSMENT DONE. LOCHIA  Light rubra. PAIN  0 OF 10.iV PATENT  RIGHT ARM WITHOUT REDNESS OR SWELLING AVENDAÑO TO DD. SEQUENTIALS ON. ORIENTED TO UNIT PROCEDURES AND INFANT SAFETY. ENC. TO CALL WITH NEEDS.

## 2017-04-18 NOTE — IP AVS SNAPSHOT
[employer] Access Code: Activation code not generated  Current [employer] Analytics Status: Patient Declined    Your email address is not on file at [facility].  Email Addresses are required for you to sign up for [employer], please contact [phone] to verify your personal information and to provide your email address prior to attempting to register for [employer].    Lexy Olivares  [address] [place] [address], [place] [address]    Skin [employer]  A secure, online tool to manage your health information     [employer]’s Skin [employer]® is a secure, online tool that connects you to your personalized health information from the privacy of your home -- day or night - making it very easy for you to manage your healthcare. Once the activation process is completed, you can even access your medical information using the Skin Analytics hossein, which is available for free in the Apple Hossein store or Google Play store.     To learn more about Skin Analytics, visit [email or web address]    There are two levels of access available (as shown below):   My Chart Features  [facility] Primary Care Doctor [facility]  Specialists [facility]  Urgent  Care Non-[facility] Primary Care Doctor   Email your healthcare team securely and privately 24/7 X X X    Manage appointments: schedule your next appointment; view details of past/upcoming appointments X      Request prescription refills. X      View recent personal medical records, including lab and immunizations X X X X   View health record, including health history, allergies, medications X X X X   Read reports about your outpatient visits, procedures, consult and ER notes X X X X   See your discharge summary, which is a recap of your hospital and/or ER visit that includes your diagnosis, lab results, and care plan X X  X     How to register for [employer]:  Once your e-mail address has been verified, follow the following steps to sign up for [employer].     1. Go to  [email or web address]  2. Click on the Sign Up Now box, which  takes you to the New Member Sign Up page. You will need to provide the following information:  a. Enter your [employer] Access Code exactly as it appears at the top of this page. (You will not need to use this code after you’ve completed the sign-up process. If you do not sign up before the expiration date, you must request a new code.)   b. Enter your date of birth.   c. Enter your home email address.   d. Click Submit, and follow the next screen’s instructions.  3. Create a [employer] ID. This will be your [employer] login ID and cannot be changed, so think of one that is secure and easy to remember.  4. Create a [employer] password. You can change your password at any time.  5. Enter your Password Reset Question and Answer. This can be used at a later time if you forget your password.   6. Enter your e-mail address. This allows you to receive e-mail notifications when new information is available in [employer].  7. Click Sign Up. You can now view your health information.    For assistance activating your [employer] account, call [phone]

## 2017-04-18 NOTE — H&P
History and Physical    Lexy Olivares is a 37 y.o. female  -Para:     Gestational Age:  39w0d  Admitted for:   Primary  breech  Admitted to  [facility].  [facility]    HPI: Patient is admitted with the above mentioned Chief Complaint and States   Loss of fluid:   negative  Abdominal Pain:  negative  Uterine Contractions:  negative  Vaginal Bleeding:  negative  Fetal Movement:  normal  Patient denies fever, chills, nausea, vomiting , headache, visual disturbance, or dysuria  Patient's last menstrual period was 2016 (exact date).  Estimated Date of Delivery: 17  Final GRAHAM: 2017, by Ultrasound    Patient Active Problem List    Diagnosis Date Noted   • Indication for care in labor or delivery 2017   • GDM, class A2 2017   • Supervision of high risk pregnancy in second trimester 10/17/2016   • History of GDMA2 x 3 - early 1hr GTT ordered 10/17/2016       Admitting DX: pregnancy  Indication for care in labor or delivery  Pregnancy Complications:  Maternal obesity, gestational diabetes, type A2, breech  OB Risk Factors:   AMA, obesity  Labor State:    Not in labor.    History:   has a past medical history of Diabetic neuropathy (CMS-HCC) (). She also has no past medical history of Addisons disease (CMS-HCC), Allergy, Anemia, Anxiety, Arrhythmia, Arthritis, Asthma, Blood transfusion without reported diagnosis, Cancer (CMS-HCC), Cataract, CHF (congestive heart failure) (CMS-HCC), Clotting disorder (CMS-HCC), COPD (chronic obstructive pulmonary disease) (CMS-HCC), Depression, Cushings syndrome (CMS-HCC), Pulmonary emphysema (CMS-HCC), GERD (gastroesophageal reflux disease), Glaucoma, Goiter, Heart attack (CMS-HCC), Heart murmur, HIV (human immunodeficiency virus infection) (CMS-HCC), Hyperlipidemia, Hypertension, IBD (inflammatory bowel disease), Kidney disease, Meningitis, Migraine, Muscle  "disorder, Osteoporosis, Parathyroid disorder (CMS-[facility]), Pituitary disease (CMS-[facility]), Seizure (CMS-HCC), Sickle cell disease (CMS-HCC), Stroke (CMS-HCC), Substance abuse, Tuberculosis, Ulcer (CMS-HCC), or Urinary tract infection, site not specified.     has no past surgical history on file.    OB History    Para Term  AB SAB TAB Ectopic Multiple Living   4 3 3       3      # Outcome Date GA Lbr Willie/2nd Weight Sex Delivery Anes PTL Lv   4 Current            3 Term 01/18/15 39w0d  4.082 kg (9 lb) M Vag-Spont None N Y   2 Term 06 37w0d  2.268 kg (5 lb) M Vag-Spont None  Y      Comments: pt states no complications   1 Term 03 39w0d  3.175 kg (7 lb) M Vag-Spont None  Y      Comments: Pt states no complictions          Medications:    Prenatal vitamin daily     AM: insulin injections: NPH: 7  PM insulin injections: regular: 4  QHS:insulin injections: NPH: 20    No current facility-administered medications on file prior to encounter.     Current Outpatient Prescriptions on File Prior to Encounter   Medication Sig Dispense Refill   • Blood Glucose Monitoring Suppl SUPPLIES Misc Please dispense test strips for True Metrix glucometer. Patient to test 4 times daily 100 Package 4   • glucose blood (TRUE METRIX BLOOD GLUCOSE TEST) strip Patient to check blood sugar 4 times a day. 100 Strip 3   • INSULIN SYRINGE .5CC/29G (B-D INS SYR ULTRAFINE .5CC/29G) 29G X 1/2\" 0.5 ML Misc 10 Units by Does not apply route every day at 6 PM. 100 Each 10   • glucose blood strip 1 Strip by Other route 4 times a day. 100 Strip 10   • Blood Glucose Monitoring Suppl SUPPLIES Misc Please dispense test strips for Accu Check Polina. Patient to check sugars 4 times daily 100 Package 5   • Blood Glucose Monitoring Suppl SUPPLIES Misc Please dispense lancets for Accu Check Polina. Patient to check sugars 4 times daily 100 Package 5   • norethindrone (MICRONOR) 0.35 MG tablet Take 1 Tab by mouth every day. 28 Tab 11   • Blood Glucose " "Monitoring Suppl SUPPLIES MISC Test strips order: Test strips for FREESTYLE LITE. Please dispense for patient to check blood sugars 4 times daily. 100 Strip prn   • Lancets MISC Ultra fine Lancets order: Lancets for patient to check blood sugars 4 times daily. 100 Each prn   • Insulin Syringe-Needle U-100 29G X 1/2\" 0.5 ML MISC Patient to inject insulin at bed time 100 Each 6   • ferrous sulfate 325 (65 FE) MG tablet Take 325 mg by mouth every day.     • Prenatal Vitamins (DIS) TABS Take 1 Each by mouth every day. 60 Each 1       Allergies:  Review of patient's allergies indicates no known allergies.    ROS:   Neuro: negative    Cardiovascular: negative  Gastro intestinal: negative  Genitourinary: negative            Physical Exam:  /81 mmHg  Pulse 85  Temp(Src) 36.9 °C (98.5 °F) (Temporal)  Resp 16  Ht 1.549 m (5' 1\")  Wt 116.121 kg (256 lb)  BMI 48.40 kg/m2  LMP 06/30/2016 (Exact Date)  Constitutional: healthy-appearing, Well-developed, well-nourished, moderately obese, in no acute distress  No JVD: at 30 degrees  HEENT: EOMI, Sclera clear, anicteric, Oropharynx clear, no lesions, Neck supple with midline trachea, Thyroid without masses and Trachea midline  Breast Exam: negative  Cardio: regular rate and rhythm, S1, S2 normal, no murmur, click, rub or gallop  Lung: unlabored respirations, no intercostal retractions or accessory muscle use; lungs CTAB  Abdomen: abdomen is soft without significant tenderness, masses, organomegaly or guarding, rebound tenderness absent, guarding absent, bowel sounds normal  Extremity: feet normal, good pulses, normal color, temperature and sensation    SVE deferred  Pelvis: Adequate  Fetal Assessment: Fetal heart variability: moderate  Fetal Heart Rate accelerations: no  Baseline FHR: 135 per minute  Uterine contractions: none  Estimated Fetal Weight: 3000 - 3500g      Labs:      Prenatal Results         1st Trimester Date Time   ABO  B  11/09/16 0828   RH  POS  11/09/16 " 0828   Antibody  NEG  11/09/16 0828   CBC/PLT/DIFF      HGB  13.3 g/dL 02/28/17 1006   Platelets  213 K/uL 11/09/16 0828   HGB A1C   5.4 % 12/15/16 1004   1 Hr GCT  186 mg/dL (H) 11/09/16 0828   3 Hr GTT  120 mg/dL 11/21/16 0910   Rubella  179.10 IU/mL 11/09/16 0828   RPR      Urine Culture  Mixed skin nicolasa 10-50,000 cfu/mL  11/09/16 0728   24 Urine Protein       24 Urine Creatinine       HBsAg  Negative  11/09/16 0828   Hep CAB       HIV      Gonorrhea      Chlamydia      TSH       Free T4        TB      Pap      SYPHILUS TREP QUAL [id number] [[id number]][      2nd Trimester Date Time   HCT  39.7 % 02/28/17 1006   HGB  13.3 g/dL 02/28/17 1006   1 Hr GCT  186 mg/dL (H) 11/09/16 0828   3 Hr GTT  120 mg/dL 11/21/16 0910   24-28 Weeks Date Time   1 Hr GCT   186 mg/dL (H) 11/09/16 0828   TSH       Free T4       24 Urine Protein      24 Urine Creatinine      BUN      Creatinine      GFR      AST      ALT      Uric Acid      LDH      3rd Trimester Date Time   HCT  39.7 % 02/28/17 1006   HGB  13.3 g/dL 02/28/17 1006   TSH      Free T4      24 Hr Urine Protein      24 Hr Urine Creatinine      SYPHILUS TREP QUAL  Non Reactive  02/28/17 1006   35-37 Weeks Date Time   GBS PCR LB  Negative  03/23/17 1451   GBS PCR  Negative  03/23/17 1451   Genetic Screening Date Time   Cystic Fibrosis      AFP Quad  Normal  11/09/16 0828   Sickle Cell                  View all results for this pregnancy        Ultrasound: breech presentation, vertex on right with transverse lie    Assessment:  Gestational Age:  39w0d  Labor State:   Not in labor   Risk Factors:   advanced maternal age, obesity  Pregnancy Complications: gestational diabetes type A2, breech     Taking:  AM: insulin injections: NPH: 7  PM insulin injections: regular: 4  QHS:insulin injections: NPH: 20    Patient Active Problem List    Diagnosis Date Noted   • Indication for care in labor or delivery 04/18/2017   • GDM, class A2 02/09/2017   • Supervision of high risk pregnancy in second  trimester 10/17/2016   • History of GDMA2 x 3 - early 1hr GTT ordered 10/17/2016       Plan:   Admitted for: Primary  breech; vertex on right with transverse lie  Primary  for breech as above  Does not desire sterilization at this time.   POC glucose this AM, fasting  CBC  Blood bank; hold for OR    Antibiotics: Prior to surgery       Harley Anders M.D.

## 2017-04-19 LAB
ERYTHROCYTE [DISTWIDTH] IN BLOOD BY AUTOMATED COUNT: 47.4 FL (ref 35.9–50)
HCT VFR BLD AUTO: 32.9 % (ref 37–47)
HGB BLD-MCNC: 10.8 G/DL (ref 12–16)
MCH RBC QN AUTO: 30.3 PG (ref 27–33)
MCHC RBC AUTO-ENTMCNC: 32.8 G/DL (ref 33.6–35)
MCV RBC AUTO: 92.4 FL (ref 81.4–97.8)
PLATELET # BLD AUTO: 136 K/UL (ref 164–446)
PMV BLD AUTO: 11.4 FL (ref 9–12.9)
RBC # BLD AUTO: 3.56 M/UL (ref 4.2–5.4)
WBC # BLD AUTO: 8.2 K/UL (ref 4.8–10.8)

## 2017-04-19 PROCEDURE — 36415 COLL VENOUS BLD VENIPUNCTURE: CPT

## 2017-04-19 PROCEDURE — 700102 HCHG RX REV CODE 250 W/ 637 OVERRIDE(OP): Performed by: OBSTETRICS & GYNECOLOGY

## 2017-04-19 PROCEDURE — A9270 NON-COVERED ITEM OR SERVICE: HCPCS | Performed by: OBSTETRICS & GYNECOLOGY

## 2017-04-19 PROCEDURE — 85027 COMPLETE CBC AUTOMATED: CPT

## 2017-04-19 PROCEDURE — 770002 HCHG ROOM/CARE - OB PRIVATE (112)

## 2017-04-19 PROCEDURE — 700111 HCHG RX REV CODE 636 W/ 250 OVERRIDE (IP): Performed by: ANESTHESIOLOGY

## 2017-04-19 RX ORDER — OXYCODONE HYDROCHLORIDE AND ACETAMINOPHEN 5; 325 MG/1; MG/1
1 TABLET ORAL EVERY 4 HOURS PRN
Status: DISCONTINUED | OUTPATIENT
Start: 2017-04-19 | End: 2017-04-21 | Stop reason: HOSPADM

## 2017-04-19 RX ORDER — MORPHINE SULFATE 4 MG/ML
4 INJECTION, SOLUTION INTRAMUSCULAR; INTRAVENOUS
Status: DISCONTINUED | OUTPATIENT
Start: 2017-04-19 | End: 2017-04-21 | Stop reason: HOSPADM

## 2017-04-19 RX ORDER — MEDROXYPROGESTERONE ACETATE 150 MG/ML
150 INJECTION, SUSPENSION INTRAMUSCULAR ONCE
Status: DISCONTINUED | OUTPATIENT
Start: 2017-04-21 | End: 2017-04-21 | Stop reason: HOSPADM

## 2017-04-19 RX ORDER — IBUPROFEN 600 MG/1
600 TABLET ORAL EVERY 6 HOURS PRN
Status: DISCONTINUED | OUTPATIENT
Start: 2017-04-19 | End: 2017-04-21 | Stop reason: HOSPADM

## 2017-04-19 RX ORDER — OXYCODONE HYDROCHLORIDE AND ACETAMINOPHEN 5; 325 MG/1; MG/1
2 TABLET ORAL EVERY 4 HOURS PRN
Status: DISCONTINUED | OUTPATIENT
Start: 2017-04-19 | End: 2017-04-21 | Stop reason: HOSPADM

## 2017-04-19 RX ADMIN — OXYCODONE HYDROCHLORIDE AND ACETAMINOPHEN 1 TABLET: 5; 325 TABLET ORAL at 19:28

## 2017-04-19 RX ADMIN — Medication 1 TABLET: at 08:43

## 2017-04-19 RX ADMIN — IBUPROFEN 600 MG: 600 TABLET, FILM COATED ORAL at 19:28

## 2017-04-19 RX ADMIN — OXYCODONE HYDROCHLORIDE AND ACETAMINOPHEN 1 TABLET: 5; 325 TABLET ORAL at 12:51

## 2017-04-19 RX ADMIN — KETOROLAC TROMETHAMINE 30 MG: 30 INJECTION, SOLUTION INTRAMUSCULAR at 06:17

## 2017-04-19 ASSESSMENT — PAIN SCALES - GENERAL
PAINLEVEL_OUTOF10: 2
PAINLEVEL_OUTOF10: 7
PAINLEVEL_OUTOF10: 4
PAINLEVEL_OUTOF10: 4
PAINLEVEL_OUTOF10: 2
PAINLEVEL_OUTOF10: 1
PAINLEVEL_OUTOF10: 1

## 2017-04-19 NOTE — PROGRESS NOTES
Name:   Lexy Olivares   Date/Time:  2017 6:12 AM  Gestational Age:  39w1d  Admit Date:   2017  Admitting Dx:   pregnancy  Indication for care in labor or delivery  Transverse     POD# 1 S/P Primary  for breech    S:  Abdominal pain Small amount; controlled with medications   Ambulating   yes  Tolerating PO  yes  Flatus    no  Bleeding   Changing pad less than once an hour; not much bleeding  Voiding   yes   Dizziness   no  Breast feeding  yes  Breast tenderness  no    O:  Pulse: 82, Heart Rate (Monitored): 79  Blood Pressure: 101/64 mmHg     Temp  Av.6 °C (97.8 °F)  Min: 36.1 °C (97 °F)  Max: 36.9 °C (98.5 °F)  Heart: regular rate and rhythm without gallops or murmurs  Lungs: clear bases  Abdomen: flat and soft/nontender / bowel sounds present / incision clean and dry small amount of sanguinous fluid at incision site.  Extremities: non-tender  Catheter: DC'd    Intake/Output Summary (Last 24 hours) at 17 0612  Last data filed at 17 0400   Gross per 24 hour   Intake   2100 ml   Output   1750 ml   Net    350 ml       A:  POD# 1 S/P Primary  for breech  Stable/progressing well    P:  Routine C/S Postpartum care, continue pain management, encourage ambulation, anticipate DC POD#3     Harley Anders M.D.

## 2017-04-19 NOTE — CARE PLAN
Problem: Communication  Goal: The ability to communicate needs accurately and effectively will improve  Outcome: PROGRESSING AS EXPECTED  Pt. Ambulated to bathroom, no Signs & symptoms of dizziness. Pt. Taking adequate PO fluids and having adequate Urine output.     Problem: Pain Management  Goal: Pain level will decrease to patient’s comfort goal  Outcome: PROGRESSING AS EXPECTED  Pt. Would like to call for PRN pain medications when she needs them.

## 2017-04-20 PROCEDURE — 700112 HCHG RX REV CODE 229: Performed by: OBSTETRICS & GYNECOLOGY

## 2017-04-20 PROCEDURE — A9270 NON-COVERED ITEM OR SERVICE: HCPCS | Performed by: OBSTETRICS & GYNECOLOGY

## 2017-04-20 PROCEDURE — 700102 HCHG RX REV CODE 250 W/ 637 OVERRIDE(OP): Performed by: OBSTETRICS & GYNECOLOGY

## 2017-04-20 PROCEDURE — 770002 HCHG ROOM/CARE - OB PRIVATE (112)

## 2017-04-20 RX ADMIN — IBUPROFEN 600 MG: 600 TABLET, FILM COATED ORAL at 12:27

## 2017-04-20 RX ADMIN — Medication 1 TABLET: at 07:55

## 2017-04-20 RX ADMIN — IBUPROFEN 600 MG: 600 TABLET, FILM COATED ORAL at 04:19

## 2017-04-20 RX ADMIN — OXYCODONE HYDROCHLORIDE AND ACETAMINOPHEN 1 TABLET: 5; 325 TABLET ORAL at 04:19

## 2017-04-20 RX ADMIN — IBUPROFEN 600 MG: 600 TABLET, FILM COATED ORAL at 20:10

## 2017-04-20 RX ADMIN — OXYCODONE HYDROCHLORIDE AND ACETAMINOPHEN 1 TABLET: 5; 325 TABLET ORAL at 20:10

## 2017-04-20 RX ADMIN — DOCUSATE SODIUM 100 MG: 100 CAPSULE ORAL at 07:55

## 2017-04-20 ASSESSMENT — PAIN SCALES - GENERAL
PAINLEVEL_OUTOF10: 0
PAINLEVEL_OUTOF10: 2
PAINLEVEL_OUTOF10: 2
PAINLEVEL_OUTOF10: 0
PAINLEVEL_OUTOF10: 0
PAINLEVEL_OUTOF10: 5
PAINLEVEL_OUTOF10: 6
PAINLEVEL_OUTOF10: 3
PAINLEVEL_OUTOF10: 6
PAINLEVEL_OUTOF10: 3

## 2017-04-20 NOTE — CONSULTS
Nurse assist baby to left breast football hold & STS. Observed deep latch. Encouraged patient to express colostrum on right breast to help heal nipple and allow breasts to air. Pamphlet provided with review of resources.

## 2017-04-20 NOTE — PROGRESS NOTES
Patient assessed, Vitals stable, fundus firm, bleeding within normal limits, all questions & concerns answered, Pt. Has call light within reach & working.

## 2017-04-20 NOTE — PROGRESS NOTES
Noted that  has not had her hearing checked.  Discussed with hearing .  Discussed with mom.  Educated mom regarding hearing test.  Received verbal understanding.  Hearing test will be done today.

## 2017-04-20 NOTE — PROGRESS NOTES
Received report from night shift RN. Assumed care of patient. Pt assessed and stable. VSS. Patient reports 2/10 pain at this time.  Patient declines interventions for pain at this time.  Discussed plan of care for day with patient and received verbal understanding. Call light within reach, bed in low position.  Educated pt re fall precautions and received verbal understanding.  However, pt continues to refuse bed alarms.  Pt is alert, oriented, steady on feet and calls appropriately.

## 2017-04-20 NOTE — CARE PLAN
Problem: Safety  Goal: Will remain free from injury  Pt remains free from injury.      Problem: Infection  Goal: Will remain free from infection  Pt does not have any s/s of infection at this time.    Problem: Venous Thromboembolism (VTW)/Deep Vein Thrombosis (DVT) Prevention:  Goal: Patient will participate in Venous Thrombosis (VTE)/Deep Vein Thrombosis (DVT)Prevention Measures  Pt independently in room.    Problem: Bowel/Gastric:  Goal: Normal bowel function is maintained or improved  Pt has not had a bowel movement since prior to admit.  Administered stool softeners per MAR.      Problem: Knowledge Deficit  Goal: Knowledge of disease process/condition, treatment plan, diagnostic tests, and medications will improve  Educated pt on condition, treatment plan, tests and medications.  Received verbal understanding.    Problem: Discharge Barriers/Planning  Goal: Patient’s continuum of care needs will be met  Pt is post op day 2 after delivery of  via  section.  Will be discharged home with father of baby once medically cleared.    Problem: Fluid Volume:  Goal: Will maintain balanced intake and output  Pt eats and drinks at least 75% of all meals.

## 2017-04-20 NOTE — PROGRESS NOTES
Post Partum Progress Note    Name:   Lexy Nam Elise   Date/Time:  2017 - 6:50 AM  Chief Admitting Dx:  pregnancy  Indication for care in labor or delivery  Transverse   Delivery Type:   for oblique lie, macrosomia  Post-Op/Post Partum Days #:  2    Subjective:  Abdominal pain: no  Ambulating:   yes  Tolerating liquids:  yes  Tolerating food:  yes common adult  Flatus:   yes  BM:    no  Bleeding:   with a small amount of bleeding  Voiding:   yes  Dizziness:   no  Feeding:   breast    Filed Vitals:    17 0400 17 0745 17 1144 17   BP: 101/64 103/65 113/70 124/63   Pulse: 82 86 80 99   Temp: 36.5 °C (97.7 °F) 36.8 °C (98.2 °F) 36.6 °C (97.9 °F) 37 °C (98.6 °F)   TempSrc:       Resp: 18 18 18 18   Height:       Weight:       SpO2: 92% 97%  96%       Exam:  Breast: Tenderness no and Engorged no  Abdomen: Abdomen soft, non-tender. BS normal. No masses,  No organomegaly  Fundal Tenderness:  no  Fundus Firm: yes  Incision: no evidence of infection, separation or keloid formation.  Below umbilicus: yes  Perineum: perineum intact  Lochia: mild  Extremities: trace extremities, peripheral pulses and reflexes normal    Meds:  Current Facility-Administered Medications   Medication Dose   • ibuprofen (MOTRIN) tablet 600 mg  600 mg   • oxycodone-acetaminophen (PERCOCET) 5-325 MG per tablet 1 Tab  1 Tab   • oxycodone-acetaminophen (PERCOCET) 5-325 MG per tablet 2 Tab  2 Tab   • morphine (pf) 4 mg/ml injection 4 mg  4 mg   • [START ON 2017] medroxyPROGESTERone (DEPO-PROVERA) injection 150 mg  150 mg   • oxytocin (PITOCIN) infusion (for postpartum)   mL/hr   • LR infusion     • PRN oxytocin (PITOCIN) (20 Units/1000 mL) PRN for excessive uterine bleeding - See Admin Instr  125-999 mL/hr   • docusate sodium (COLACE) capsule 100 mg  100 mg   • simethicone (MYLICON) chewable tab 80 mg  80 mg   • prenatal plus vitamin (STUARTNATAL 1+1) 27-1 MG tablet 1 Tab  1 Tab   •  ondansetron (ZOFRAN) syringe/vial injection 4 mg  4 mg    Or   • ondansetron (ZOFRAN ODT) dispertab 4 mg  4 mg   • HYDROmorphone (DILAUDID) injection 0.2 mg  0.2 mg   • ephedrine injection 10 mg  10 mg   • ondansetron (ZOFRAN) syringe/vial injection 4 mg  4 mg   • metoclopramide (REGLAN) injection 10 mg  10 mg   • naloxone (NARCAN) 0.4 mg in NS 1,000 mL infusion  0.25 mcg/kg/hr   • diphenhydrAMINE (BENADRYL) injection 12.5 mg  12.5 mg   • naloxone (NARCAN) 0.4 mg in NS 1,000 mL infusion  0.25 mcg/kg/hr       Labs:   Recent Labs      17   0830  17   0534   WBC  7.4  8.2   RBC  4.40  3.56*   HEMOGLOBIN  13.3  10.8*   HEMATOCRIT  39.4  32.9*   MCV  89.5  92.4   MCH  30.2  30.3   MCHC  33.8  32.8*   RDW  44.8  47.4   PLATELETCT  171  136*   MPV  10.7  11.4       Assessment:  Chief Admitting Dx:  pregnancy  Indication for care in labor or delivery  Transverse   Delivery Type:   for oblique lie  Tubal Ligation:  no    Plan:  Continue routine post partum care. Advance care, encourage ambulation, pain control, anticipate d/c home tomorrow, POD#3    AMAYA Garcia.

## 2017-04-20 NOTE — CARE PLAN
Problem: Communication  Goal: The ability to communicate needs accurately and effectively will improve  Outcome: PROGRESSING AS EXPECTED  Pt. Ambulating to bathroom to void, taking adequate PO fluids, taking care of infant and breastfeeding.     Problem: Pain Management  Goal: Pain level will decrease to patient’s comfort goal  Outcome: PROGRESSING AS EXPECTED  Patient would like to call for PRN pain medications when she needs them.

## 2017-04-21 VITALS
OXYGEN SATURATION: 93 % | HEART RATE: 72 BPM | DIASTOLIC BLOOD PRESSURE: 59 MMHG | RESPIRATION RATE: 20 BRPM | BODY MASS INDEX: 48.33 KG/M2 | WEIGHT: 256 LBS | HEIGHT: 61 IN | SYSTOLIC BLOOD PRESSURE: 105 MMHG | TEMPERATURE: 97.2 F

## 2017-04-21 PROBLEM — O24.419 GDM, CLASS A2: Status: RESOLVED | Noted: 2017-02-09 | Resolved: 2017-04-21

## 2017-04-21 PROCEDURE — A9270 NON-COVERED ITEM OR SERVICE: HCPCS | Performed by: OBSTETRICS & GYNECOLOGY

## 2017-04-21 PROCEDURE — 700102 HCHG RX REV CODE 250 W/ 637 OVERRIDE(OP): Performed by: OBSTETRICS & GYNECOLOGY

## 2017-04-21 RX ORDER — IBUPROFEN 600 MG/1
600 TABLET ORAL EVERY 6 HOURS PRN
Qty: 30 TAB | Refills: 2 | Status: SHIPPED | OUTPATIENT
Start: 2017-04-21

## 2017-04-21 RX ORDER — PSEUDOEPHEDRINE HCL 30 MG
100 TABLET ORAL 2 TIMES DAILY PRN
Qty: 60 CAP | Refills: 2 | Status: SHIPPED | OUTPATIENT
Start: 2017-04-21

## 2017-04-21 RX ORDER — OXYCODONE HYDROCHLORIDE AND ACETAMINOPHEN 5; 325 MG/1; MG/1
1 TABLET ORAL EVERY 4 HOURS PRN
Qty: 30 TAB | Refills: 0 | Status: SHIPPED | OUTPATIENT
Start: 2017-04-21

## 2017-04-21 RX ADMIN — Medication 1 TABLET: at 08:30

## 2017-04-21 RX ADMIN — OXYCODONE HYDROCHLORIDE AND ACETAMINOPHEN 1 TABLET: 5; 325 TABLET ORAL at 05:25

## 2017-04-21 RX ADMIN — IBUPROFEN 600 MG: 600 TABLET, FILM COATED ORAL at 05:25

## 2017-04-21 ASSESSMENT — PAIN SCALES - GENERAL
PAINLEVEL_OUTOF10: 0
PAINLEVEL_OUTOF10: 4
PAINLEVEL_OUTOF10: 0

## 2017-04-21 NOTE — CARE PLAN
Problem: Altered physiologic condition related to postoperative  delivery  Goal: Patient physiologically stable as evidenced by normal lochia, palpable uterine involution and vital signs within normal limits  Outcome: PROGRESSING AS EXPECTED  Fundus firm @ U, lochia rubra minimal. Surgical incision with steri-strips. V/S within defined limits.    Problem: Alteration in comfort related to surgical incision and/or after birth pains  Goal: Patient verbalizes acceptable pain level  Outcome: PROGRESSING AS EXPECTED  Patient verbalized acceptable pain level @ this time. Will be medicated as needed.

## 2017-04-21 NOTE — DISCHARGE INSTRUCTIONS
POSTPARTUM DISCHARGE INSTRUCTIONS FOR MOM    YOB: 1980   Age: 37 y.o.               Admit Date: 2017     Discharge Date: 2017  Attending Doctor:  Dorinda Hughes*                  Allergies:  Review of patient's allergies indicates no known allergies.    Discharged to home by car. Discharged via wheelchair, hospital escort: Yes.  Special equipment needed: Not Applicable  Belongings with: Personal  Be sure to schedule a follow-up appointment with your primary care doctor or any specialists as instructed.     Discharge Plan:   Diet Plan: Discussed  Activity Level: Discussed  Confirmed Follow up Appointment: Appointment Scheduled  Confirmed Symptoms Management: Discussed  Medication Reconciliation Updated: Yes  Influenza Vaccine Indication: Patient Refuses    REASONS TO CALL YOUR OBSTETRICIAN:  1.   Persistent fever or shaking chills (Temperature higher than 100.4)  2.   Heavy bleeding (soaking more than 1 pad per hour); Passing clots  3.   Foul odor from vagina  4.   Mastitis (Breast infection; breast pain, chills, fever, redness)  5.   Urinary pain, burning or frequency  6.   Episiotomy infection  7.   Abdominal incision infection  8.   Severe depression longer than 24 hours    HAND WASHING  · Prior to handling the baby.  · Before breastfeeding or bottle feeding baby.  · After using the bathroom or changing the baby's diaper.    WOUND CARE  Ask your physician for additional care instructions.  In general:    ·  Incision:      · Keep clean and dry.    · Do NOT lift anything heavier than your baby for up to 6 weeks.    · There should not be any opening or pus.      VAGINAL CARE  · Nothing inside vagina for 6 weeks: no sexual intercourse, tampons or douching.  · Bleeding may continue for 2-4 weeks.  Amount may vary.    · Call your physician for heavy bleeding which means soaking more than 1 pad per hour    BIRTH CONTROL  · It is possible to become pregnant at any time after  "delivery and while breastfeeding.  · Plan to discuss a method of birth control with your physician at your follow up visit. visit.    DIET AND ELIMINATION  · Eating more fiber (bran cereal, fruits, and vegetables) and drinking plenty of fluids will help to avoid constipation.  · Urinary frequency after childbirth is normal.    POSTPARTUM BLUES  During the first few days after birth, you may experience a sense of the \"blues\" which may include impatience, irritability or even crying.  These feeling come and go quickly.  However, as many as 1 in 10 women experience emotional symptoms known as postpartum depression.    Postpartum depression:  May start as early as the second or third day after delivery or take several weeks or months to develop.  Symptoms of \"blues\" are present, but are more intense:  Crying spells; loss of appetite; feelings of hopelessness or loss of control; fear of touching the baby; over concern or no concern at all about the baby; little or no concern about your own appearance/caring for yourself; and/or inability to sleep or excessive sleeping.  Contact your physician if you are experiencing any of these symptoms.    Crisis Hotline:  · [place] Crisis Hotline:  8-921-KBQONPJ  Or 1-423.133.3490  · [place] Crisis Hotline:  1-944.264.7042  Or 321-067-7833    PREVENTING SHAKEN BABY:  If you are angry or stressed, PUT THE BABY IN THE CRIB, step away, take some deep breaths, and wait until you are calm to care for the baby.  DO NOT SHAKE THE BABY.  You are not alone, call a supporter for help.    · Crisis Call Center 24/7 crisis line 141-120-9916 or 1-789.207.6189  · You can also text them, text \"ANSWER\" to 507413    QUIT SMOKING/TOBACCO USE:  I understand the use of any tobacco products increases my chance of suffering from future heart disease and could cause other illnesses which may shorten my life. Quitting the use of tobacco products is the single most important thing I can do to improve my " health. For further information on smoking / tobacco cessation call a Toll Free Quit Line at 1-289.664.3405 (*[employer] [place]) or 1-503.647.6301 (American Lung Association) or you can access the web based program at www.lungusa.org.    · [place] Tobacco Users Help Line:  (290) 259-6295       Toll Free: 1-369.889.3469  · Quit Tobacco Program [facility] (464)402-5229    DEPRESSION / SUICIDE RISK:  As you are discharged from this [facility], it is important to learn how to keep safe from harming yourself.    Recognize the warning signs:  · Abrupt changes in personality, positive or negative- including increase in energy   · Giving away possessions  · Change in eating patterns- significant weight changes-  positive or negative  · Change in sleeping patterns- unable to sleep or sleeping all the time   · Unwillingness or inability to communicate  · Depression  · Unusual sadness, discouragement and loneliness  · Talk of wanting to die  · Neglect of personal appearance   · Rebelliousness- reckless behavior  · Withdrawal from people/activities they love  · Confusion- inability to concentrate     If you or a loved one observes any of these behaviors or has concerns about self-harm, here's what you can do:  · Talk about it- your feelings and reasons for harming yourself  · Remove any means that you might use to hurt yourself (examples: pills, rope, extension cords, firearm)  · Get professional help from the community (Mental Health, Substance Abuse, psychological counseling)  · Do not be alone:Call your Safe Contact- someone whom you trust who will be there for you.  · Call your local CRISIS HOTLINE 020-1435 or 085-569-7998  · Call your local Children's Mobile Crisis Response Team [place] (986) 369-7450 or www.TARDIS-BOX.com  · Call the toll free National Suicide Prevention Hotlines   · National Suicide Prevention Lifeline 993-204-CXJA (1832)  · National Hope Line Network  800-SUICIDE (938-0525)    DISCHARGE SURVEY:  Thank you for choosing [facility].  We hope we provided you with very good care.  You may be receiving a survey in the mail.  Please fill it out.  Your opinion is valuable to us.    ADDITIONAL EDUCATIONAL MATERIALS GIVEN TO PATIENT:        My signature on this form indicates that:  1.  I have reviewed and understand the above information  2.  My questions regarding this information have been answered to my satisfaction.  3.  I have formulated a plan with my discharge nurse to obtain my prescribed medication for home.

## 2017-04-21 NOTE — PROGRESS NOTES
Discharge education reviewed with pt. Follow up instructions and prescriptions given. Pt verbalized understanding.

## 2017-04-21 NOTE — PROGRESS NOTES
Infant with 9% weight loss and mother declines to supplement at this time, spoke with mother who states BF well, reports some difficulty achieving deep latch but declines offer for BF assistance, mother reports BF Q 2-3 hours for 7-10 minutes on one breast each time, encouraged to BF Q 2 hours, also encouraged to feed longer each time, plan is to BF Q 2 hours for 10-20 minutes on each breast, mother able to HE colostrum easily and reports episodes of leaking milk, also reports that breasts feeling more full prior to BF and after BF breasts feel significantly softer, mother states has [facility], encouraged to follow-up with [facility] counselor for BF assistance as needed, discussed infant's weight loss and educated to watch for signs of dehydration, mother to monitor for jaundice as well as voiding and stooling, if decreased diaper output or signs of jaundice mother aware she will need to supplement.

## 2017-04-21 NOTE — DISCHARGE SUMMARY
Discharge Summary:      Lexy Nam ShaistaKanika      Admit Date:   2017  Discharge Date:  2017     Admitting diagnosis:  pregnancy  Indication for care in labor or delivery  Transverse   Discharge Diagnosis: Status post  for unstable lie, transverse.  Pregnancy Complications: gestational diabetes  Tubal Ligation:  no        History:  Past Medical History   Diagnosis Date   • Diabetic neuropathy (CMS-[facility])      gestational with every pregnancy     OB History    Para Term  AB SAB TAB Ectopic Multiple Living   4 3 3       3      # Outcome Date GA Lbr Willie/2nd Weight Sex Delivery Anes PTL Lv   4 Current            3 Term 01/18/15 39w0d  4.082 kg (9 lb) M Vag-Spont None N Y   2 Term 06 37w0d  2.268 kg (5 lb) M Vag-Spont None  Y      Comments: pt states no complications   1 Term 03 39w0d  3.175 kg (7 lb) M Vag-Spont None  Y      Comments: Pt states no complictions           Review of patient's allergies indicates no known allergies.  Patient Active Problem List    Diagnosis Date Noted   • Indication for care in labor or delivery 2017        Hospital Course:   37 y.o. , now para 4, was admitted with the above mentioned diagnosis, underwent Primary  unstable lie, transverse,  for unstable lie, transverse. Patient postpartum course was unremarkable, with progressive advancement in diet , ambulation and toleration of oral analgesia. Patient without complaints today and desires discharge.      Filed Vitals:    17 1144 17 0800 17   BP: 113/70 124/63 113/74 115/75   Pulse: 80 99 90 82   Temp: 36.6 °C (97.9 °F) 37 °C (98.6 °F) 36.8 °C (98.3 °F) 37 °C (98.6 °F)   TempSrc:       Resp:    Height:       Weight:       SpO2:  96% 98% 99%       Current Facility-Administered Medications   Medication Dose   • ibuprofen (MOTRIN) tablet 600 mg  600 mg   • oxycodone-acetaminophen (PERCOCET) 5-325 MG per  tablet 1 Tab  1 Tab   • oxycodone-acetaminophen (PERCOCET) 5-325 MG per tablet 2 Tab  2 Tab   • morphine (pf) 4 mg/ml injection 4 mg  4 mg   • medroxyPROGESTERone (DEPO-PROVERA) injection 150 mg  150 mg   • oxytocin (PITOCIN) infusion (for postpartum)   mL/hr   • LR infusion     • PRN oxytocin (PITOCIN) (20 Units/1000 mL) PRN for excessive uterine bleeding - See Admin Instr  125-999 mL/hr   • docusate sodium (COLACE) capsule 100 mg  100 mg   • simethicone (MYLICON) chewable tab 80 mg  80 mg   • prenatal plus vitamin (STUARTNATAL 1+1) 27-1 MG tablet 1 Tab  1 Tab   • ondansetron (ZOFRAN) syringe/vial injection 4 mg  4 mg    Or   • ondansetron (ZOFRAN ODT) dispertab 4 mg  4 mg   • HYDROmorphone (DILAUDID) injection 0.2 mg  0.2 mg   • ephedrine injection 10 mg  10 mg   • ondansetron (ZOFRAN) syringe/vial injection 4 mg  4 mg   • metoclopramide (REGLAN) injection 10 mg  10 mg   • naloxone (NARCAN) 0.4 mg in NS 1,000 mL infusion  0.25 mcg/kg/hr   • diphenhydrAMINE (BENADRYL) injection 12.5 mg  12.5 mg   • naloxone (NARCAN) 0.4 mg in NS 1,000 mL infusion  0.25 mcg/kg/hr       Exam:  Breast Exam: negative  Abdomen: Abdomen soft, non-tender. BS normal. No masses,  No organomegaly  Fundus Non Tender: yes  Incision: dry and intact  Perineum: perineum intact  Extremity: extremities, peripheral pulses and reflexes normal, no edema, redness or tenderness in the calves or thighs     Labs:  Recent Labs      04/18/17   0830  04/19/17   0534   WBC  7.4  8.2   RBC  4.40  3.56*   HEMOGLOBIN  13.3  10.8*   HEMATOCRIT  39.4  32.9*   MCV  89.5  92.4   MCH  30.2  30.3   MCHC  33.8  32.8*   RDW  44.8  47.4   PLATELETCT  171  136*   MPV  10.7  11.4        Activity:   Discharge to home  Pelvic Rest x 6 weeks    Assessment:  normal postpartum course  Discharge Assessment: No areas of skin breakdown/redness; surgical incision intact/healing     [facility]: [facility] in 5 weeks for vaginal ; 1 week for incision check.       Discharge Meds:   Current Outpatient Prescriptions   Medication Sig Dispense Refill   • oxycodone-acetaminophen (PERCOCET) 5-325 MG Tab Take 1 Tab by mouth every four hours as needed (for Moderate Pain (Pain Scale 4-6) after delivery). 30 Tab 0   • ibuprofen (MOTRIN) 600 MG Tab Take 1 Tab by mouth every 6 hours as needed (For cramping after delivery; do not give if patient is receiving ketorolac (Toradol)). 30 Tab 2   • docusate sodium 100 MG Cap Take 100 mg by mouth 2 times a day as needed for Constipation. 60 Cap 2       KP BritoNBRUCE

## 2017-04-26 ENCOUNTER — POST PARTUM (OUTPATIENT)
Dept: OBGYN | Facility: CLINIC | Age: 37
End: 2017-04-26

## 2017-04-26 VITALS — SYSTOLIC BLOOD PRESSURE: 104 MMHG | BODY MASS INDEX: 43.86 KG/M2 | DIASTOLIC BLOOD PRESSURE: 64 MMHG | WEIGHT: 232 LBS

## 2017-04-26 DIAGNOSIS — Z98.891 STATUS POST PRIMARY LOW TRANSVERSE CESAREAN SECTION: ICD-10-CM

## 2017-04-26 DIAGNOSIS — Z51.89 VISIT FOR WOUND CHECK: ICD-10-CM

## 2017-04-26 PROCEDURE — 99024 POSTOP FOLLOW-UP VISIT: CPT | Performed by: OBSTETRICS & GYNECOLOGY

## 2017-04-26 NOTE — MR AVS SNAPSHOT
Lexy Pasihsan Olivares   2017 11:30 AM   Post Partum   MRN: [id number]    Department:  [facility]   Dept Phone:  [phone]    Description:  Female : 1980   Provider:  Dorinda Alfredo M.D.           Allergies as of 2017     No Known Allergies      Vital Signs     Blood Pressure Weight Last Menstrual Period Breastfeeding? Smoking Status       104/64 mmHg 105.235 kg (232 lb) 2016 (Exact Date) Unknown Never Smoker        Basic Information     Date Of Birth Sex Race Ethnicity Preferred Language    1980 Female  or   Origin ([place] Islander,Irish,Jamaican,[place]al[place], etc) [place]er      Problem List              ICD-10-CM Priority Class Noted - Resolved    Indication for care in labor or delivery O75.9   2017 - Present      Health Maintenance        Date Due Completion Dates    PAP SMEAR 10/17/2019 10/17/2016, 2014    IMM DTaP/Tdap/Td Vaccine (3 - Td) 2027, 2014            Current Immunizations     Influenza Vaccine Quad Inj (Pf) 10/21/2014    Tdap Vaccine 2017, 2014  9:16 AM      Below and/or attached are the medications your provider expects you to take. Review all of your home medications and newly ordered medications with your provider and/or pharmacist. Follow medication instructions as directed by your provider and/or pharmacist. Please keep your medication list with you and share with your provider. Update the information when medications are discontinued, doses are changed, or new medications (including over-the-counter products) are added; and carry medication information at all times in the event of emergency situations     Allergies:  No Known Allergies          Medications  Valid as of: 2017 - 11:44 AM    Generic Name Brand Name Tablet Size Instructions for use    Docusate Sodium (Cap)  MG Take 100 mg by mouth 2 times a day as needed for Constipation.        Ferrous Sulfate (Tab)  ferrous sulfate 325 (65 FE) MG Take 325 mg by mouth every day.        Ibuprofen (Tab) MOTRIN 600 MG Take 1 Tab by mouth every 6 hours as needed (For cramping after delivery; do not give if patient is receiving ketorolac (Toradol)).        Oxycodone-Acetaminophen (Tab) PERCOCET 5-325 MG Take 1 Tab by mouth every four hours as needed (for Moderate Pain (Pain Scale 4-6) after delivery).        Prenatal Vitamins (Tab) Prenatal Vitamins (DIS) Take 1 Each by mouth every day.        .                 Medicines prescribed today were sent to:     [facility] [address] [place] [address], [place] - [address]    [address] [place] [address]    Phone: [phone] Fax: [phone]    Open 24 Hours?: No      Medication refill instructions:       If your prescription bottle indicates you have medication refills left, it is not necessary to call your provider’s office. Please contact your pharmacy and they will refill your medication.    If your prescription bottle indicates you do not have any refills left, you may request refills at any time through one of the following ways: The online [employer] system (except Urgent Care), by calling your provider’s office, or by asking your pharmacy to contact your provider’s office with a refill request. Medication refills are processed only during regular business hours and may not be available until the next business day. Your provider may request additional information or to have a follow-up visit with you prior to refilling your medication.   *Please Note: Medication refills are assigned a new Rx number when refilled electronically. Your pharmacy may indicate that no refills were authorized even though a new prescription for the same medication is available at the pharmacy. Please request the medicine by name with the pharmacy before contacting your provider for a refill.           MyChart Status: Patient Declined

## 2017-04-26 NOTE — PROGRESS NOTES
CC= WOUND CHECK S/P PRIMARY LTCS 1 WEEK    History of present illness:   37 y.o. presents for wound check  Doing well  Pain is well controlled  (+) breast feeding    Review of systems:  Pertinent positives documented in HPI and all other systems reviewed & are negative    All PMH, PSH, allergies, social history and FH reviewed and updated today:  Past Medical History   Diagnosis Date   • Diabetic neuropathy (CMS-[facility]) 2014     gestational with every pregnancy       Past Surgical History   Procedure Laterality Date   • Primary c section  2017     Procedure: PRIMARY C SECTION;  Surgeon: Dorinda Alfredo M.D.;  Location: LABOR AND DELIVERY;  Service:        Allergies: No Known Allergies    Social History     Social History   • Marital Status:      Spouse Name: N/A   • Number of Children: N/A   • Years of Education: N/A     Occupational History   • Not on file.     Social History Main Topics   • Smoking status: Never Smoker    • Smokeless tobacco: Never Used   • Alcohol Use: No   • Drug Use: No   • Sexual Activity:     Partners: Male     Birth Control/ Protection: Condom      Comment: none. Planned pregnancy     Other Topics Concern   • Not on file     Social History Narrative       No family history on file.    Physical exam:  Blood pressure 104/64, weight 105.235 kg (232 lb), last menstrual period 2016, unknown if currently breastfeeding.    General:appears stated age, is in no apparent distress, is well developed and well nourished  Head: normocephalic, non-tender  Abdomen: Bowel sounds positive, nondistended, soft, nontender x4, no rebound or guarding. No organomegaly. No masses.  INCISION - DRY/CLEAN/INTACT  Skin: No rashes, or ulcers or lesions seen  Psychiatric: Patient shows appropriate affect, is alert and oriented x3, intact judgment and insight.  1. Status post primary low transverse  section     2. Visit for wound check     3. Continue to breast feed  3. Keep wound dry  4.  PP 6 week ff-up

## 2017-04-26 NOTE — PROGRESS NOTES
Pt here for C/S check delivered on 4/18/17  Currently breast feeding.   # [phone]  Pt states no complaints.

## 2017-06-01 PROBLEM — Z98.891 HISTORY OF CESAREAN DELIVERY: Status: ACTIVE | Noted: 2017-06-01

## 2017-06-01 PROBLEM — Z86.32 HISTORY OF GESTATIONAL DIABETES: Status: ACTIVE | Noted: 2017-06-01

## (undated) DEVICE — BAG, SPONGE COUNT 50600

## (undated) DEVICE — HEAD HOLDER JUNIOR/ADULT

## (undated) DEVICE — SET EXTENSION WITH 2 PORTS (48EA/CA) ***PART #2C8610 IS A SUBSTITUTE*****

## (undated) DEVICE — PACK C-SECTION (2EA/CA)

## (undated) DEVICE — WATER IRRIG. STER. 1500 ML - (9/CA)

## (undated) DEVICE — SUTURE 3-0 VICRYL PLUS CT-1 - 36 INCH (36/BX)

## (undated) DEVICE — ELECTRODE DUAL RETURN W/ CORD - (50/PK)

## (undated) DEVICE — CATHETER IV NON-SAFETY 18 GA X 1 1/4 (50/BX 4BX/CA)

## (undated) DEVICE — SUTURE 1 CHROMIC GUTCT-1 27 (36PK/BX)"

## (undated) DEVICE — GLOVE BIOGEL SZ 6.5 SURGICAL PF LTX (50PR/BX 4BX/CA)

## (undated) DEVICE — SODIUM CHL IRRIGATION 0.9% 1000ML (12EA/CA)

## (undated) DEVICE — TUBING CLEARLINK DUO-VENT - C-FLO (48EA/CA)

## (undated) DEVICE — STAPLER SKIN DISP - (6/BX 10BX/CA) VISISTAT

## (undated) DEVICE — KIT  I.V. START (100EA/CA)

## (undated) DEVICE — SUTURE 0 VICRYL PLUS CT-1 - 36 INCH (36/BX)

## (undated) DEVICE — DETERGENT RENUZYME PLUS 10 OZ PACKET (50/BX)

## (undated) DEVICE — TRAY SPINAL ANESTHESIA NON-SAFETY (10/CA)